# Patient Record
Sex: MALE | Race: WHITE | NOT HISPANIC OR LATINO | Employment: FULL TIME | URBAN - METROPOLITAN AREA
[De-identification: names, ages, dates, MRNs, and addresses within clinical notes are randomized per-mention and may not be internally consistent; named-entity substitution may affect disease eponyms.]

---

## 2017-07-13 ENCOUNTER — OFFICE VISIT (OUTPATIENT)
Dept: FAMILY MEDICINE CLINIC | Facility: CLINIC | Age: 72
End: 2017-07-13

## 2017-07-13 VITALS
OXYGEN SATURATION: 96 % | DIASTOLIC BLOOD PRESSURE: 82 MMHG | HEART RATE: 67 BPM | HEIGHT: 72 IN | SYSTOLIC BLOOD PRESSURE: 128 MMHG | WEIGHT: 246 LBS | BODY MASS INDEX: 33.32 KG/M2

## 2017-07-13 DIAGNOSIS — G47.33 OSA ON CPAP: Primary | ICD-10-CM

## 2017-07-13 DIAGNOSIS — Z99.89 OSA ON CPAP: Primary | ICD-10-CM

## 2017-07-13 DIAGNOSIS — Z12.11 SCREEN FOR COLON CANCER: ICD-10-CM

## 2017-07-13 PROCEDURE — 99213 OFFICE O/P EST LOW 20 MIN: CPT | Performed by: INTERNAL MEDICINE

## 2017-07-13 RX ORDER — SILDENAFIL 100 MG/1
100 TABLET, FILM COATED ORAL DAILY PRN
COMMUNITY
End: 2018-11-01 | Stop reason: SDUPTHER

## 2017-07-13 NOTE — PROGRESS NOTES
Chief Complaint   Patient presents with   • Follow-up      Subjective   Shaka Gurrola is a 71 y.o. male    History of Present Illness the patient was having tight feeling in his head.  He eliminated daily Cialis as a potential source for that and this made things better.  We have been following his blood pressure closely.  His blood pressure is good.  The following portions of the patient's history were reviewed and updated as appropriate: allergies, current medications, past social history and problem list    No Known Allergies   Past Medical History:   Diagnosis Date   • Atrophic testicle     Left   • BPH with elevated PSA    • CAD (coronary artery disease)    • ED (erectile dysfunction)    • Hiatal hernia with gastroesophageal reflux    • Hx of iron deficiency anemia    • Inguinal hernia     Right   • Osteoarthritis    • Tubular adenoma of colon       Past Surgical History:   Procedure Laterality Date   • ANKLE SURGERY Left     Plate   • CORONARY ANGIOPLASTY WITH STENT PLACEMENT     • KNEE ARTHROSCOPY Right    • PROSTATE BIOPSY     • TOTAL HIP ARTHROPLASTY REVISION Left       Social History     Social History   • Marital status:      Spouse name: N/A   • Number of children: N/A   • Years of education: N/A     Occupational History   • Not on file.     Social History Main Topics   • Smoking status: Never Smoker   • Smokeless tobacco: Never Used   • Alcohol use Yes   • Drug use: Not on file   • Sexual activity: Not on file     Other Topics Concern   • Not on file     Social History Narrative      Current Outpatient Prescriptions on File Prior to Visit   Medication Sig Dispense Refill   • clopidogrel (PLAVIX) 75 MG tablet Take 75 mg by mouth Daily.     • esomeprazole (nexIUM) 40 MG capsule Take 40 mg by mouth Every Morning Before Breakfast.     • rosuvastatin (CRESTOR) 20 MG tablet Take 20 mg by mouth Daily.     • [DISCONTINUED] tadalafil (CIALIS) 20 MG tablet Take 20 mg by mouth Daily As Needed for erectile  dysfunction.       No current facility-administered medications on file prior to visit.         Review of Systems   Constitutional: Negative for appetite change and fatigue.   HENT: Negative for ear pain and sore throat.    Eyes: Negative for itching and visual disturbance.   Respiratory: Negative for cough and shortness of breath.    Cardiovascular: Negative for chest pain and palpitations.   Gastrointestinal: Negative for abdominal pain and nausea.   Endocrine: Negative for cold intolerance and heat intolerance.   Genitourinary: Negative for dysuria and hematuria.   Musculoskeletal: Negative for arthralgias and back pain.   Skin: Negative for rash and wound.   Allergic/Immunologic: Negative for environmental allergies and food allergies.   Neurological: Negative for dizziness and headaches.   Hematological: Negative for adenopathy. Does not bruise/bleed easily.   Psychiatric/Behavioral: Negative for sleep disturbance. The patient is not nervous/anxious.        Objective     Vitals:    07/13/17 0846   BP: 128/82   Pulse: 67   SpO2: 96%       Physical Exam   Constitutional: He is oriented to person, place, and time.   Genitourinary: Prostate normal.   Neurological: He is alert and oriented to person, place, and time. He has normal reflexes. He displays normal reflexes. No cranial nerve deficit. He exhibits normal muscle tone. Coordination normal.   Vitals reviewed.      Assessment/Plan   Problem List Items Addressed This Visit     None      Visit Diagnoses     FADY on CPAP    -  Primary    Relevant Orders    Ambulatory Referral to Sleep Medicine    Screen for colon cancer        Relevant Orders    Ambulatory Referral For Screening Colonoscopy

## 2017-08-29 ENCOUNTER — TELEPHONE (OUTPATIENT)
Dept: FAMILY MEDICINE CLINIC | Facility: CLINIC | Age: 72
End: 2017-08-29

## 2017-08-29 DIAGNOSIS — G47.33 OBSTRUCTIVE SLEEP APNEA SYNDROME: Primary | ICD-10-CM

## 2017-08-29 NOTE — TELEPHONE ENCOUNTER
NEEDS FULL FACIAL MASK/HEAD GEAR FOR C-PAP,  HAS SLEEP APNEA. SEND RX TO [facility] PH: [phone] PLEASE CALL PATIENT WHEN RX SENT IN. [phone].

## 2017-12-04 ENCOUNTER — OUTSIDE FACILITY SERVICE (OUTPATIENT)
Dept: GASTROENTEROLOGY | Facility: CLINIC | Age: 72
End: 2017-12-04

## 2017-12-04 PROCEDURE — 45378 DIAGNOSTIC COLONOSCOPY: CPT | Performed by: INTERNAL MEDICINE

## 2018-01-03 RX ORDER — ROSUVASTATIN CALCIUM 20 MG/1
20 TABLET, COATED ORAL DAILY
Qty: 30 TABLET | Refills: 0 | Status: SHIPPED | OUTPATIENT
Start: 2018-01-03 | End: 2018-02-02 | Stop reason: SDUPTHER

## 2018-01-03 NOTE — TELEPHONE ENCOUNTER
E-Rx Rosuvastatin 20 mg tablets, take 1 tablet po qd #30, 0 refills to [facility] on [facility].

## 2018-02-02 ENCOUNTER — TELEPHONE (OUTPATIENT)
Dept: FAMILY MEDICINE CLINIC | Facility: CLINIC | Age: 73
End: 2018-02-02

## 2018-02-02 RX ORDER — ROSUVASTATIN CALCIUM 20 MG/1
20 TABLET, COATED ORAL DAILY
Qty: 30 TABLET | Refills: 0 | Status: SHIPPED | OUTPATIENT
Start: 2018-02-02 | End: 2022-09-26

## 2018-02-02 NOTE — TELEPHONE ENCOUNTER
E-Rx Crestor 20 mg tablets, take 1 tablet po qd #30, 0 refills to [facility] on [facility].

## 2018-07-03 ENCOUNTER — OFFICE VISIT (OUTPATIENT)
Dept: FAMILY MEDICINE CLINIC | Facility: CLINIC | Age: 73
End: 2018-07-03

## 2018-07-03 VITALS
WEIGHT: 205 LBS | BODY MASS INDEX: 27.77 KG/M2 | SYSTOLIC BLOOD PRESSURE: 148 MMHG | HEART RATE: 67 BPM | HEIGHT: 72 IN | OXYGEN SATURATION: 97 % | DIASTOLIC BLOOD PRESSURE: 86 MMHG

## 2018-07-03 DIAGNOSIS — R42 DIZZINESS: Primary | ICD-10-CM

## 2018-07-03 DIAGNOSIS — R03.0 ELEVATED BLOOD PRESSURE READING: ICD-10-CM

## 2018-07-03 PROCEDURE — 99214 OFFICE O/P EST MOD 30 MIN: CPT | Performed by: PHYSICIAN ASSISTANT

## 2018-07-03 RX ORDER — LOSARTAN POTASSIUM 25 MG/1
25 TABLET ORAL DAILY
Qty: 30 TABLET | Refills: 0 | Status: SHIPPED | OUTPATIENT
Start: 2018-07-03 | End: 2018-07-30 | Stop reason: SINTOL

## 2018-07-03 RX ORDER — ASPIRIN 81 MG/1
81 TABLET ORAL DAILY
COMMUNITY

## 2018-07-03 NOTE — PROGRESS NOTES
I have reviewed the notes, assessments, and/or procedures performed by SAMUEL Headley, I concur with her/his documentation of Shaka Gurrola.

## 2018-07-03 NOTE — PROGRESS NOTES
"    Chief Complaint   Patient presents with   • Dizziness     elevated BP       HPI     Shaka Gurrola is a pleasant 72 y.o. male who presents for evaluation of \"chief complaint.\" Pt reports constant dizziness for the last several weeks. 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 Has not used sildenafil in the last week but has persistent symptoms. Feels a little off balance with walking but also has symptoms at rest. He denies new medications, falls, headache, chest pain, dyspnea, syncope, vertigo, or URI complaints. He purchased a blood pressure cuff and states readings the last 2 days have been consistently in the 140s/90s. This AM his blood pressure was 148/93. He has no prior history of hypertension per his report. Has 4 coronary stents. Had \"normal\" stress test, urine flow cytometry, echocardiogram, and labs at [facility] in March (data deficit).       Past Medical History:   Diagnosis Date   • Atrophic testicle     Left   • BPH with elevated PSA    • CAD (coronary artery disease)    • ED (erectile dysfunction)    • Hiatal hernia with gastroesophageal reflux    • Hx of iron deficiency anemia    • Inguinal hernia     Right   • Osteoarthritis    • Tubular adenoma of colon        Past Surgical History:   Procedure Laterality Date   • ANKLE SURGERY Left     Plate   • CORONARY ANGIOPLASTY WITH STENT PLACEMENT     • KNEE ARTHROSCOPY Right    • PROSTATE BIOPSY     • TOTAL HIP ARTHROPLASTY REVISION Left        Family History   Problem Relation Age of Onset   • Aneurysm Mother    • Lung cancer Father        Social History     Social History   • Marital status:      Spouse name: N/A   • Number of children: N/A   • Years of education: N/A     Occupational History   • Not on file.     Social History Main Topics   • Smoking status: Never Smoker   • Smokeless tobacco: Never Used   • Alcohol use Yes   • Drug use: No   • Sexual activity: Not on file     Other " Topics Concern   • Not on file     Social History Narrative   • No narrative on file       No Known Allergies    ROS    Review of Systems   HENT: Positive for hearing loss (chronic, no acute hearing loss) and tinnitus (chronic). Negative for ear pain and rhinorrhea.    Respiratory: Negative for cough and shortness of breath.    Cardiovascular: Negative for chest pain and palpitations.   Neurological: Positive for dizziness. Negative for syncope.       Vitals:    07/03/18 1025   BP: 147/84   Pulse: 67   SpO2: 97%         Current Outpatient Prescriptions:   •  aspirin 81 MG EC tablet, Take 81 mg by mouth Daily., Disp: , Rfl:   •  rosuvastatin (CRESTOR) 20 MG tablet, Take 1 tablet by mouth Daily., Disp: 30 tablet, Rfl: 0  •  sildenafil (VIAGRA) 100 MG tablet, Take 100 mg by mouth Daily As Needed for erectile dysfunction., Disp: , Rfl:   •  clopidogrel (PLAVIX) 75 MG tablet, Take 75 mg by mouth Daily., Disp: , Rfl:   •  esomeprazole (nexIUM) 40 MG capsule, Take 40 mg by mouth Every Morning Before Breakfast., Disp: , Rfl:   •  losartan (COZAAR) 25 MG tablet, Take 1 tablet by mouth Daily., Disp: 30 tablet, Rfl: 0    PE    Physical Exam   Constitutional: He appears well-developed and well-nourished. No distress.   HENT:   Head: Normocephalic.   Right Ear: Tympanic membrane and ear canal normal.   Left Ear: Tympanic membrane and ear canal normal.   Cardiovascular: Normal rate, regular rhythm and normal heart sounds.    No murmur heard.  Pulmonary/Chest: Effort normal and breath sounds normal. He has no wheezes. He has no rales.   Neurological: He is alert. He displays a negative Romberg sign. Gait normal.   CN II-XII grossly intact/symmetric   Psychiatric: He has a normal mood and affect. His behavior is normal.        A/P    Problem List Items Addressed This Visit     Dizziness - Primary  -Uncertain etiology.  -Present a few weeks.   -BP has been running mildly high and may contribute. Certainly not low today.     -Benefits, risks, possible side-effects of low-dose losartan were discussed. Pt agreeable to a trial.  -Advised pt to monitor blood pressures at home, keep log, and bring in home device on follow-up in 2 weeks.        Elevated blood pressure reading          Plan of care reviewed with patient at the conclusion of today's visit. Education was provided regarding diagnosis, management and any prescribed or recommended OTC medications.  Patient verbalizes understanding of and agreement with management plan.        SAMUEL Wlison

## 2018-07-30 ENCOUNTER — OFFICE VISIT (OUTPATIENT)
Dept: FAMILY MEDICINE CLINIC | Facility: CLINIC | Age: 73
End: 2018-07-30

## 2018-07-30 VITALS
WEIGHT: 244.8 LBS | SYSTOLIC BLOOD PRESSURE: 132 MMHG | HEIGHT: 72 IN | DIASTOLIC BLOOD PRESSURE: 84 MMHG | BODY MASS INDEX: 33.16 KG/M2 | TEMPERATURE: 97.8 F

## 2018-07-30 DIAGNOSIS — R42 DIZZINESS: ICD-10-CM

## 2018-07-30 DIAGNOSIS — R03.0 ELEVATED BLOOD PRESSURE READING: Primary | ICD-10-CM

## 2018-07-30 DIAGNOSIS — N43.3 HYDROCELE, RIGHT: ICD-10-CM

## 2018-07-30 PROCEDURE — 99213 OFFICE O/P EST LOW 20 MIN: CPT | Performed by: PHYSICIAN ASSISTANT

## 2018-07-30 RX ORDER — PHENTERMINE AND TOPIRAMATE 7.5; 46 MG/1; MG/1
CAPSULE, EXTENDED RELEASE ORAL
Refills: 0 | COMMUNITY
Start: 2018-07-03 | End: 2018-07-30 | Stop reason: SINTOL

## 2018-07-30 RX ORDER — AMLODIPINE BESYLATE 2.5 MG/1
2.5 TABLET ORAL DAILY
Qty: 30 TABLET | Refills: 5 | Status: SHIPPED | OUTPATIENT
Start: 2018-07-30 | End: 2018-12-18

## 2018-07-30 NOTE — PROGRESS NOTES
Chief Complaint   Patient presents with   • Follow-up     Dizziness       HPI     Shaka Gurrola is a pleasant 73 y.o. male who is here for 1 month  follow-up of dizziness. Blood pressure at home in 140/80-90s. Dizziness is resolved however loose stools began after starting losartan. Of note, pt also stopped Qsymia just prior to his last visit. /63 per home cuff today but 142/82 by office device on recheck. Hx hydrocele right testicle. Pt had u/s in February at [facility] and states mildly increased in size since then. Wants to r/o inguinal hernia. Denies groin pain.      Past Medical History:   Diagnosis Date   • Atrophic testicle     Left   • BPH with elevated PSA    • CAD (coronary artery disease)    • ED (erectile dysfunction)    • Hiatal hernia with gastroesophageal reflux    • Hx of iron deficiency anemia    • Inguinal hernia     Right   • Osteoarthritis    • Tubular adenoma of colon        Past Surgical History:   Procedure Laterality Date   • ANKLE SURGERY Left     Plate   • CORONARY ANGIOPLASTY WITH STENT PLACEMENT     • KNEE ARTHROSCOPY Right    • PROSTATE BIOPSY     • TOTAL HIP ARTHROPLASTY REVISION Left        Family History   Problem Relation Age of Onset   • Aneurysm Mother    • Lung cancer Father        Social History     Social History   • Marital status:      Spouse name: N/A   • Number of children: N/A   • Years of education: N/A     Occupational History   • Not on file.     Social History Main Topics   • Smoking status: Never Smoker   • Smokeless tobacco: Never Used   • Alcohol use Yes   • Drug use: No   • Sexual activity: Not on file     Other Topics Concern   • Not on file     Social History Narrative   • No narrative on file       No Known Allergies    ROS    Review of Systems   Cardiovascular: Negative for chest pain.   Genitourinary: Positive for scrotal swelling.   Neurological: Negative for light-headedness.       Vitals:    07/30/18 1004   BP: 132/84   Temp: 97.8 °F (36.6 °C)          Current Outpatient Prescriptions:   •  aspirin 81 MG EC tablet, Take 81 mg by mouth Daily., Disp: , Rfl:   •  esomeprazole (nexIUM) 40 MG capsule, Take 40 mg by mouth Every Morning Before Breakfast., Disp: , Rfl:   •  rosuvastatin (CRESTOR) 20 MG tablet, Take 1 tablet by mouth Daily., Disp: 30 tablet, Rfl: 0  •  sildenafil (VIAGRA) 100 MG tablet, Take 100 mg by mouth Daily As Needed for erectile dysfunction., Disp: , Rfl:   •  amLODIPine (NORVASC) 2.5 MG tablet, Take 1 tablet by mouth Daily., Disp: 30 tablet, Rfl: 5    PE    Physical Exam   Constitutional: He appears well-developed and well-nourished. No distress.   HENT:   Head: Normocephalic.   Cardiovascular: Normal rate, regular rhythm and normal heart sounds.    No murmur heard.  Pulmonary/Chest: Effort normal and breath sounds normal. He has no wheezes. He has no rales.   Abdominal: Hernia confirmed negative in the right inguinal area.   Genitourinary:   Genitourinary Comments: Large right hydrocele   Neurological: He is alert.   Psychiatric: He has a normal mood and affect. His behavior is normal.       Results    No results found for this or any previous visit.    A/P    Problem List Items Addressed This Visit        Cardiovascular and Mediastinum    Elevated blood pressure reading - Primary  -Improved.        Genitourinary    Hydrocele, right  -No inguinal hernia by exam today. Monitor  -Request records from [facility]       Other    Dizziness  -Now resolved. Blood pressure vs Qsymia side-effect.   -Mild SBP elevation today on recheck. Home cuff does not appear accurate. Recommended returning and obtaining different device.   -Blood pressure improved on losartan. Change to low dose amlodipine d/t side effects.  --Advised pt monitor blood pressures and call office with persistent blood pressures >140/90            Plan of care reviewed with patient at the conclusion of today's visit. Education was provided regarding diagnosis, management and any  prescribed or recommended OTC medications.  Patient verbalizes understanding of and agreement with management plan.        SAMUEL Wilson

## 2018-11-01 ENCOUNTER — TELEPHONE (OUTPATIENT)
Dept: FAMILY MEDICINE CLINIC | Facility: CLINIC | Age: 73
End: 2018-11-01

## 2018-11-01 RX ORDER — SILDENAFIL 100 MG/1
100 TABLET, FILM COATED ORAL DAILY PRN
Qty: 30 TABLET | Refills: 1 | Status: SHIPPED | OUTPATIENT
Start: 2018-11-01 | End: 2021-09-24 | Stop reason: SDUPTHER

## 2018-11-09 ENCOUNTER — OFFICE VISIT (OUTPATIENT)
Dept: FAMILY MEDICINE CLINIC | Facility: CLINIC | Age: 73
End: 2018-11-09

## 2018-11-09 ENCOUNTER — LAB (OUTPATIENT)
Dept: LAB | Facility: HOSPITAL | Age: 73
End: 2018-11-09

## 2018-11-09 VITALS
SYSTOLIC BLOOD PRESSURE: 118 MMHG | WEIGHT: 244.2 LBS | TEMPERATURE: 97.4 F | OXYGEN SATURATION: 96 % | HEART RATE: 76 BPM | BODY MASS INDEX: 33.11 KG/M2 | DIASTOLIC BLOOD PRESSURE: 68 MMHG

## 2018-11-09 DIAGNOSIS — R19.7 WATERY DIARRHEA: Primary | ICD-10-CM

## 2018-11-09 DIAGNOSIS — R19.7 WATERY DIARRHEA: ICD-10-CM

## 2018-11-09 LAB
ALBUMIN SERPL-MCNC: 4.15 G/DL (ref 3.2–4.8)
ALBUMIN/GLOB SERPL: 1.8 G/DL (ref 1.5–2.5)
ALP SERPL-CCNC: 62 U/L (ref 25–100)
ALT SERPL W P-5'-P-CCNC: 14 U/L (ref 7–40)
ANION GAP SERPL CALCULATED.3IONS-SCNC: 7 MMOL/L (ref 3–11)
AST SERPL-CCNC: 22 U/L (ref 0–33)
BASOPHILS # BLD AUTO: 0.03 10*3/MM3 (ref 0–0.2)
BASOPHILS NFR BLD AUTO: 0.4 % (ref 0–1)
BILIRUB SERPL-MCNC: 0.7 MG/DL (ref 0.3–1.2)
BUN BLD-MCNC: 15 MG/DL (ref 9–23)
BUN/CREAT SERPL: 16.1 (ref 7–25)
C DIFF TOX GENS STL QL NAA+PROBE: NOT DETECTED
CALCIUM SPEC-SCNC: 9.1 MG/DL (ref 8.7–10.4)
CHLORIDE SERPL-SCNC: 105 MMOL/L (ref 99–109)
CO2 SERPL-SCNC: 25 MMOL/L (ref 20–31)
CREAT BLD-MCNC: 0.93 MG/DL (ref 0.6–1.3)
DEPRECATED RDW RBC AUTO: 45.2 FL (ref 37–54)
EOSINOPHIL # BLD AUTO: 0.15 10*3/MM3 (ref 0–0.3)
EOSINOPHIL NFR BLD AUTO: 2.2 % (ref 0–3)
ERYTHROCYTE [DISTWIDTH] IN BLOOD BY AUTOMATED COUNT: 13.1 % (ref 11.3–14.5)
GFR SERPL CREATININE-BSD FRML MDRD: 80 ML/MIN/1.73
GLOBULIN UR ELPH-MCNC: 2.4 GM/DL
GLUCOSE BLD-MCNC: 93 MG/DL (ref 70–100)
HCT VFR BLD AUTO: 49.8 % (ref 38.9–50.9)
HEMOCCULT STL QL: NEGATIVE
HGB BLD-MCNC: 16.4 G/DL (ref 13.1–17.5)
IMM GRANULOCYTES # BLD: 0.02 10*3/MM3 (ref 0–0.03)
IMM GRANULOCYTES NFR BLD: 0.3 % (ref 0–0.6)
LYMPHOCYTES # BLD AUTO: 1.7 10*3/MM3 (ref 0.6–4.8)
LYMPHOCYTES NFR BLD AUTO: 24.6 % (ref 24–44)
MCH RBC QN AUTO: 30.9 PG (ref 27–31)
MCHC RBC AUTO-ENTMCNC: 32.9 G/DL (ref 32–36)
MCV RBC AUTO: 94 FL (ref 80–99)
MONOCYTES # BLD AUTO: 0.85 10*3/MM3 (ref 0–1)
MONOCYTES NFR BLD AUTO: 12.3 % (ref 0–12)
NEUTROPHILS # BLD AUTO: 4.17 10*3/MM3 (ref 1.5–8.3)
NEUTROPHILS NFR BLD AUTO: 60.2 % (ref 41–71)
PLATELET # BLD AUTO: 183 10*3/MM3 (ref 150–450)
PMV BLD AUTO: 11.1 FL (ref 6–12)
POTASSIUM BLD-SCNC: 4.6 MMOL/L (ref 3.5–5.5)
PROT SERPL-MCNC: 6.5 G/DL (ref 5.7–8.2)
RBC # BLD AUTO: 5.3 10*6/MM3 (ref 4.2–5.76)
SODIUM BLD-SCNC: 137 MMOL/L (ref 132–146)
WBC NRBC COR # BLD: 6.92 10*3/MM3 (ref 3.5–10.8)
WBC STL QL MICRO: NORMAL

## 2018-11-09 PROCEDURE — 82272 OCCULT BLD FECES 1-3 TESTS: CPT | Performed by: INTERNAL MEDICINE

## 2018-11-09 PROCEDURE — 80053 COMPREHEN METABOLIC PANEL: CPT | Performed by: INTERNAL MEDICINE

## 2018-11-09 PROCEDURE — 99214 OFFICE O/P EST MOD 30 MIN: CPT | Performed by: INTERNAL MEDICINE

## 2018-11-09 PROCEDURE — 85025 COMPLETE CBC W/AUTO DIFF WBC: CPT | Performed by: INTERNAL MEDICINE

## 2018-11-09 PROCEDURE — 87209 SMEAR COMPLEX STAIN: CPT | Performed by: INTERNAL MEDICINE

## 2018-11-09 PROCEDURE — 87046 STOOL CULTR AEROBIC BACT EA: CPT | Performed by: INTERNAL MEDICINE

## 2018-11-09 PROCEDURE — 87045 FECES CULTURE AEROBIC BACT: CPT | Performed by: INTERNAL MEDICINE

## 2018-11-09 PROCEDURE — 87493 C DIFF AMPLIFIED PROBE: CPT | Performed by: INTERNAL MEDICINE

## 2018-11-09 PROCEDURE — 87205 SMEAR GRAM STAIN: CPT | Performed by: INTERNAL MEDICINE

## 2018-11-09 PROCEDURE — 87177 OVA AND PARASITES SMEARS: CPT | Performed by: INTERNAL MEDICINE

## 2018-11-09 RX ORDER — MELOXICAM 15 MG/1
15 TABLET ORAL DAILY
COMMUNITY
End: 2020-09-23

## 2018-11-09 NOTE — PROGRESS NOTES
"Chief Complaint:  Watery diarrhea    HPI:  Shaka Gurrola is a 73 y.o. male who presents today for watery diarrhea ongoing for 1 month now. He denies any fevers, chills or abdominal pain. No nausea. He has several episodes of \"exlplosive\" diarrhea, per patient on a daily basis. Usually right after he eats starting after breakfast. He reports traveling to [place] about 5 weeks ago and symptoms started 1 week after returning and was gradual onset. He has not pain in abdomen at all and feels no relief after bowel movement. He has no prior issues with bowel movements in the past. He denies any blood in stool but states that it has an orange tint to it at times. He did not do any hiking or anything outside of the city in [place] or here in the . No recent abx and no sick contacts. Last c-scope was 2 years ago and it was normal, per patient. No recent changes in medication other than switching to mobic from aleve.     ROS:  Constitutional: no fevers, night sweats or unexplained weight loss  Eyes: no vision changes  ENT: no runny nose, ear pain, sore throat  Cardio: no chest pain, palpitations  Pulm: no shortness of breath, wheezing, or cough  GI: no abdominal pain , frequent watery diarrhea  : no difficulty urinating  MSK: no difficulty ambulating, no joint pain  Neuro: no weakness, dizziness or headache  Psych: no trouble sleeping  Endo: no change in appetite      Past Medical History:   Diagnosis Date   • Atrophic testicle     Left   • BPH with elevated PSA    • CAD (coronary artery disease)    • ED (erectile dysfunction)    • Hiatal hernia with gastroesophageal reflux    • Hx of iron deficiency anemia    • Inguinal hernia     Right   • Osteoarthritis    • Tubular adenoma of colon       Family History   Problem Relation Age of Onset   • Aneurysm Mother    • Lung cancer Father       Social History     Social History   • Marital status:      Spouse name: N/A   • Number of children: N/A   • Years of education: " N/A     Occupational History   • Not on file.     Social History Main Topics   • Smoking status: Never Smoker   • Smokeless tobacco: Never Used   • Alcohol use Yes   • Drug use: No   • Sexual activity: Not on file     Other Topics Concern   • Not on file     Social History Narrative   • No narrative on file      No Known Allergies   Immunization History   Administered Date(s) Administered   • Influenza Whole 10/04/2017   • Tdap 10/04/2017   • Zostavax 10/01/2016        PE:  Vitals:    11/09/18 0845   BP: 118/68   Pulse: 76   Temp: 97.4 °F (36.3 °C)   SpO2: 96%        Gen Appearance: NAD  HEENT: Normocephalic, PERRLA, no thyromegaly, trache midline  Heart: RRR, normal S1 and S2, no murmur  Lungs: CTA b/l, no wheezing, no crackles  Abdomen: Soft, non-tender, non-distended, no guarding and BSx4  MSK: Moves all extremities well, normal gait, no peripheral edema  Pulses: Palpable and equal b/l  Lymph nodes: No palpable lymphadenopathy   Neuro: No focal deficits      Current Outpatient Prescriptions   Medication Sig Dispense Refill   • aspirin 81 MG EC tablet Take 81 mg by mouth Daily.     • esomeprazole (nexIUM) 40 MG capsule Take 40 mg by mouth Every Morning Before Breakfast.     • meloxicam (MOBIC) 15 MG tablet Take 15 mg by mouth Daily.     • rosuvastatin (CRESTOR) 20 MG tablet Take 1 tablet by mouth Daily. 30 tablet 0   • sildenafil (VIAGRA) 100 MG tablet Take 1 tablet by mouth Daily As Needed for erectile dysfunction. 30 tablet 1   • amLODIPine (NORVASC) 2.5 MG tablet Take 1 tablet by mouth Daily. 30 tablet 5     No current facility-administered medications for this visit.         Shaka was seen today for diarrhea.    Diagnoses and all orders for this visit:    Watery diarrhea  -     CBC & Differential; Future  -     Comprehensive Metabolic Panel; Future  -     Stool Culture - Stool, Per Rectum; Future  -     Ova & Parasite Examination - Stool, Per Rectum; Future  -     Fecal Leukocytes - Stool, Per Rectum;  Future  -     Clostridium Difficile Toxin, PCR - Stool, Per Rectum; Future  Ongoing for 1 month now. Will check stool for culture, ove and parasites with recent travel. R/o C. Diff even though he has no recent abx use or hospitalizations. Hemoccult to r/o blood in stool since patient states it is sometimes orange. Checking blood work for signs of infection and/or dehydration. Imodium as needed for now. 2mg 30-45 min before meals. May need referral to GI and/or c-scope if this continues and nothing on work up. F/u 2 weeks.        Return in about 2 weeks (around 11/23/2018).

## 2018-11-11 LAB — BACTERIA SPEC AEROBE CULT: NORMAL

## 2018-11-12 DIAGNOSIS — R19.7 DIARRHEA, UNSPECIFIED TYPE: Primary | ICD-10-CM

## 2018-11-22 LAB
O+P SPEC MICRO: NORMAL
O+P STL TRI STN: NORMAL

## 2018-12-18 ENCOUNTER — LAB (OUTPATIENT)
Dept: LAB | Facility: HOSPITAL | Age: 73
End: 2018-12-18

## 2018-12-18 ENCOUNTER — OFFICE VISIT (OUTPATIENT)
Dept: GASTROENTEROLOGY | Facility: CLINIC | Age: 73
End: 2018-12-18

## 2018-12-18 VITALS
OXYGEN SATURATION: 98 % | HEART RATE: 55 BPM | SYSTOLIC BLOOD PRESSURE: 120 MMHG | WEIGHT: 244.8 LBS | TEMPERATURE: 97 F | HEIGHT: 72 IN | BODY MASS INDEX: 33.16 KG/M2 | DIASTOLIC BLOOD PRESSURE: 78 MMHG

## 2018-12-18 DIAGNOSIS — K58.0 IRRITABLE BOWEL SYNDROME WITH DIARRHEA: ICD-10-CM

## 2018-12-18 DIAGNOSIS — K58.0 IRRITABLE BOWEL SYNDROME WITH DIARRHEA: Primary | ICD-10-CM

## 2018-12-18 PROCEDURE — 82784 ASSAY IGA/IGD/IGG/IGM EACH: CPT

## 2018-12-18 PROCEDURE — 99213 OFFICE O/P EST LOW 20 MIN: CPT | Performed by: INTERNAL MEDICINE

## 2018-12-18 PROCEDURE — 36415 COLL VENOUS BLD VENIPUNCTURE: CPT

## 2018-12-18 PROCEDURE — 83516 IMMUNOASSAY NONANTIBODY: CPT

## 2018-12-18 NOTE — PROGRESS NOTES
PCP: Carlos Enrique Jamison PA    Chief Complaint   Patient presents with   • Diarrhea       History of Present Illness:   HPI  Mr. Gurrola  returns for an office visit. He is experiencing some issues with loose watery stool for a couple of months.  The patient did travel to [place] and noticed some symptoms after that trip.  However, the patient states there may been some issues prior to the trip. Mr. Gurrola states he will have cereal in the morning and then have explosive watery stool.  This also occurs after lunch.  The patient does not experience any problems with supper.  There is no history of nocturnal diarrhea.  Mr. Gurrola denies any blood in the stool.  There is no history of any new medications other than a change of Aleve to Mobic about 60 days ago.  He states that the issues with loose stool preceded the change in medication.  There is no history of night sweats, fever or chills.  He denies any known ill contacts.  The patient does experience some bloating at times but denies any saad abdominal pain after meals.  He states that his appetite overall is good.  There is no history of joint pain or unexplained skin rash.  Past Medical History:   Diagnosis Date   • Atrophic testicle     Left   • BPH with elevated PSA    • CAD (coronary artery disease)    • ED (erectile dysfunction)    • Hiatal hernia with gastroesophageal reflux    • Hx of iron deficiency anemia    • Inguinal hernia     Right   • Osteoarthritis    • Tubular adenoma of colon        Past Surgical History:   Procedure Laterality Date   • ANKLE SURGERY Left     Plate   • CORONARY ANGIOPLASTY WITH STENT PLACEMENT     • KNEE ARTHROSCOPY Right    • PROSTATE BIOPSY     • TOTAL HIP ARTHROPLASTY REVISION Left          Current Outpatient Medications:   •  aspirin 81 MG EC tablet, Take 81 mg by mouth Daily., Disp: , Rfl:   •  esomeprazole (nexIUM) 40 MG capsule, Take 40 mg by mouth Every Morning Before Breakfast., Disp: , Rfl:   •  meloxicam (MOBIC) 15 MG tablet, Take  15 mg by mouth Daily., Disp: , Rfl:   •  rosuvastatin (CRESTOR) 20 MG tablet, Take 1 tablet by mouth Daily., Disp: 30 tablet, Rfl: 0  •  sildenafil (VIAGRA) 100 MG tablet, Take 1 tablet by mouth Daily As Needed for erectile dysfunction., Disp: 30 tablet, Rfl: 1    No Known Allergies    Family History   Problem Relation Age of Onset   • Aneurysm Mother    • Lung cancer Father        Social History     Socioeconomic History   • Marital status:      Spouse name: Not on file   • Number of children: Not on file   • Years of education: Not on file   • Highest education level: Not on file   Social Needs   • Financial resource strain: Not on file   • Food insecurity - worry: Not on file   • Food insecurity - inability: Not on file   • Transportation needs - medical: Not on file   • Transportation needs - non-medical: Not on file   Occupational History   • Not on file   Tobacco Use   • Smoking status: Never Smoker   • Smokeless tobacco: Never Used   Substance and Sexual Activity   • Alcohol use: Yes   • Drug use: No   • Sexual activity: Not on file   Other Topics Concern   • Not on file   Social History Narrative   • Not on file       Review of Systems   Constitutional: Positive for fatigue. Negative for activity change, appetite change, fever and unexpected weight change.   HENT: Negative for dental problem, hearing loss, mouth sores, postnasal drip, sneezing, trouble swallowing and voice change.    Eyes: Negative for pain, redness, itching and visual disturbance.   Respiratory: Negative for cough, choking, chest tightness, shortness of breath and wheezing.    Cardiovascular: Negative for chest pain, palpitations and leg swelling.   Gastrointestinal: Positive for abdominal distention (bloating) and diarrhea. Negative for abdominal pain, anal bleeding, blood in stool, constipation, nausea, rectal pain and vomiting.   Endocrine: Negative for cold intolerance, heat intolerance, polydipsia, polyphagia and polyuria.    Genitourinary: Negative.  Negative for dysuria, enuresis, flank pain, hematuria and urgency.   Musculoskeletal: Negative for arthralgias, back pain, gait problem, joint swelling and myalgias.   Skin: Negative for color change, pallor and rash.   Allergic/Immunologic: Negative for environmental allergies, food allergies and immunocompromised state.   Neurological: Negative for dizziness, tremors, seizures, facial asymmetry, speech difficulty, numbness and headaches.   Hematological: Negative for adenopathy.   Psychiatric/Behavioral: Negative for behavioral problems, confusion, dysphoric mood, hallucinations and self-injury.       Vitals:    12/18/18 1540   BP: 120/78   Pulse: 55   Temp: 97 °F (36.1 °C)   SpO2: 98%       Physical Exam   Constitutional: He is oriented to person, place, and time. He appears well-nourished. No distress.   HENT:   Head: Atraumatic.   Mouth/Throat: Oropharynx is clear and moist. No oropharyngeal exudate.   Eyes: EOM are normal. No scleral icterus.   Neck: Neck supple. No thyromegaly present.   Cardiovascular: Normal rate, regular rhythm and normal heart sounds. Exam reveals no gallop.   No murmur heard.  Pulmonary/Chest: Effort normal and breath sounds normal. He has no wheezes. He has no rales.   Abdominal: Soft. Bowel sounds are normal. There is no tenderness. There is no rebound.   Musculoskeletal: Normal range of motion. He exhibits no edema or tenderness.   Lymphadenopathy:     He has no cervical adenopathy.   Neurological: He is alert and oriented to person, place, and time. He exhibits normal muscle tone.   Skin: Skin is dry. No erythema.   Psychiatric: He has a normal mood and affect. His behavior is normal. Thought content normal.   Vitals reviewed.      Shaka was seen today for diarrhea.    Diagnoses and all orders for this visit:    Irritable bowel syndrome with diarrhea  -     Celiac Ab tTG DGP TIgA; Future    Other orders  -     rifaximin (XIFAXAN) 550 MG tablet; Take 1  tablet by mouth Every 8 (Eight) Hours.    The patient has experienced some issues with diarrhea for over a month and the patient believes this has been present possibly since late summer.  There are no concerning signs of unexplained weight loss or anemia.  The patient had a unremarkable colonoscopy in December 2017 without polyps.  The clinical history suggest a degree of IBS that could be post viral.      Plan: Will prescribe 14 day trial of Xifaxan.           Will check celiac panel.      I spent over 50% of the office visit counseling and answering questions from the patient.

## 2018-12-19 LAB
GLIADIN PEPTIDE IGA SER-ACNC: 2 UNITS (ref 0–19)
GLIADIN PEPTIDE IGG SER-ACNC: 3 UNITS (ref 0–19)
IGA SERPL-MCNC: 51 MG/DL (ref 61–437)
TTG IGA SER-ACNC: <2 U/ML (ref 0–3)
TTG IGG SER-ACNC: <2 U/ML (ref 0–5)

## 2018-12-20 ENCOUNTER — TELEPHONE (OUTPATIENT)
Dept: GASTROENTEROLOGY | Facility: CLINIC | Age: 73
End: 2018-12-20

## 2019-12-11 ENCOUNTER — TELEPHONE (OUTPATIENT)
Dept: FAMILY MEDICINE CLINIC | Facility: CLINIC | Age: 74
End: 2019-12-11

## 2019-12-11 ENCOUNTER — OFFICE VISIT (OUTPATIENT)
Dept: FAMILY MEDICINE CLINIC | Facility: CLINIC | Age: 74
End: 2019-12-11

## 2019-12-11 VITALS
HEART RATE: 59 BPM | TEMPERATURE: 97.5 F | OXYGEN SATURATION: 98 % | BODY MASS INDEX: 33.86 KG/M2 | SYSTOLIC BLOOD PRESSURE: 140 MMHG | HEIGHT: 72 IN | DIASTOLIC BLOOD PRESSURE: 82 MMHG | WEIGHT: 250 LBS

## 2019-12-11 DIAGNOSIS — J06.9 ACUTE URI: Primary | ICD-10-CM

## 2019-12-11 PROBLEM — Z86.0100 HISTORY OF COLON POLYPS: Status: ACTIVE | Noted: 2019-12-11

## 2019-12-11 PROBLEM — Z86.010 HISTORY OF COLON POLYPS: Status: ACTIVE | Noted: 2019-12-11

## 2019-12-11 PROBLEM — I25.10 CORONARY ARTERY DISEASE: Status: ACTIVE | Noted: 2019-12-11

## 2019-12-11 PROBLEM — N52.9 ERECTILE DYSFUNCTION: Status: ACTIVE | Noted: 2019-12-11

## 2019-12-11 PROBLEM — R97.20 ELEVATED PSA: Status: ACTIVE | Noted: 2019-12-11

## 2019-12-11 PROBLEM — K58.0 IRRITABLE BOWEL SYNDROME WITH DIARRHEA: Status: ACTIVE | Noted: 2019-12-11

## 2019-12-11 PROCEDURE — 99213 OFFICE O/P EST LOW 20 MIN: CPT | Performed by: PHYSICIAN ASSISTANT

## 2019-12-11 RX ORDER — ASPIRIN 81 MG/1
TABLET, CHEWABLE ORAL
COMMUNITY
End: 2020-09-23

## 2019-12-11 RX ORDER — AZITHROMYCIN 250 MG
CAPSULE ORAL
Qty: 6 TABLET | Refills: 0 | Status: SHIPPED | OUTPATIENT
Start: 2019-12-11 | End: 2020-08-03

## 2019-12-11 RX ORDER — FLUTICASONE PROPIONATE 50 MCG
1 SPRAY, SUSPENSION (ML) NASAL DAILY
Qty: 1 BOTTLE | Refills: 0 | Status: SHIPPED | OUTPATIENT
Start: 2019-12-11 | End: 2022-09-26

## 2019-12-11 RX ORDER — SILDENAFIL CITRATE 20 MG/1
TABLET ORAL EVERY 8 HOURS SCHEDULED
COMMUNITY
End: 2021-09-24

## 2019-12-11 RX ORDER — DEXTROMETHORPHAN HYDROBROMIDE AND PROMETHAZINE HYDROCHLORIDE 15; 6.25 MG/5ML; MG/5ML
5 SYRUP ORAL 4 TIMES DAILY PRN
Qty: 118 ML | Refills: 0 | Status: SHIPPED | OUTPATIENT
Start: 2019-12-11 | End: 2020-08-03

## 2019-12-11 NOTE — TELEPHONE ENCOUNTER
[facility] called. They need clarification on the instructions for the Zpac.   The instructions say: Take 2 tablets the first day, then 1 tablet daily for 4 days., Normal.but then in another section it says dispense as written. Please call [facility] to clarify.

## 2019-12-11 NOTE — TELEPHONE ENCOUNTER
Brand name is on back order so they will prescribe the generic and give the patient the pills only and not the pack.

## 2019-12-11 NOTE — PATIENT INSTRUCTIONS
"· Attain adequate rest and increase clear fluid intake.   · Use warm salt water gargles, lozenges, hot teat with honey as needed for throat comfort and/or Delsym syrup as needed for cough.   · Use Mucinex 600 mg twice daily and nasal saline irrigation with \"ocean\" or \"simply saline\" brand sterile nasal spray twice daily   · Use warm compresses over sinuses for comfort   · Use Tylenol 500 mg every 4 hours as needed for headache, body aches, and/or fever reduction  · Use Flonase 1 spray each nostril daily    Call or Return to office if symptoms worsen or persist.   "

## 2019-12-11 NOTE — PROGRESS NOTES
"    Chief Complaint   Patient presents with   • Cough     persistent cough and cold x4 days used OTC meds       HPI     Shaka Gurrola is a pleasant 74 y.o. male who presents for evaluation of \"chief complaint.\" Here with symptoms of productive cough, white sputum, and shortness of breath x 4 days. He admits to sore throat, losing his voice, postnasal drip, and rhinorrhea. No wheezing, fever, or chills. He was in [place] and [place] for a cruise a couple weeks ago. Returned from [place] last night. Not sleep well due to cough. No known ill contacts.      Past Medical History:   Diagnosis Date   • Atrophic testicle     Left   • BPH with elevated PSA    • CAD (coronary artery disease)    • ED (erectile dysfunction)    • Hiatal hernia with gastroesophageal reflux    • Hx of iron deficiency anemia    • Inguinal hernia     Right   • Osteoarthritis    • Tubular adenoma of colon        Past Surgical History:   Procedure Laterality Date   • ANKLE SURGERY Left     Plate   • CORONARY ANGIOPLASTY WITH STENT PLACEMENT     • KNEE ARTHROSCOPY Right    • PROSTATE BIOPSY     • TOTAL HIP ARTHROPLASTY REVISION Left        Family History   Problem Relation Age of Onset   • Aneurysm Mother    • Lung cancer Father        Social History     Socioeconomic History   • Marital status:      Spouse name: Not on file   • Number of children: Not on file   • Years of education: Not on file   • Highest education level: Not on file   Tobacco Use   • Smoking status: Never Smoker   • Smokeless tobacco: Never Used   Substance and Sexual Activity   • Alcohol use: Yes   • Drug use: No       No Known Allergies    ROS    Review of Systems   Constitutional: Positive for fatigue. Negative for chills and fever.   HENT: Positive for rhinorrhea and sore throat. Negative for ear pain and postnasal drip.    Respiratory: Positive for cough and shortness of breath. Negative for wheezing.    Cardiovascular: Negative for chest pain.   Gastrointestinal: " Positive for diarrhea (chronic). Negative for vomiting.   Musculoskeletal: Negative for myalgias.   Neurological: Negative for headache.       Vitals:    12/11/19 0936   BP: 140/82   Pulse: 59   Temp: 97.5 °F (36.4 °C)   SpO2: 98%         Current Outpatient Medications:   •  aspirin 81 MG EC tablet, Take 81 mg by mouth Daily., Disp: , Rfl:   •  esomeprazole (nexIUM) 40 MG capsule, Take 40 mg by mouth Every Morning Before Breakfast., Disp: , Rfl:   •  rosuvastatin (CRESTOR) 20 MG tablet, Take 1 tablet by mouth Daily., Disp: 30 tablet, Rfl: 0  •  sildenafil (VIAGRA) 100 MG tablet, Take 1 tablet by mouth Daily As Needed for erectile dysfunction., Disp: 30 tablet, Rfl: 1  •  aspirin (ASPIRIN 81) 81 MG chewable tablet, aspirin  81mg daily, Disp: , Rfl:   •  fluticasone (FLONASE) 50 MCG/ACT nasal spray, 1 spray into the nostril(s) as directed by provider Daily., Disp: 1 bottle, Rfl: 0  •  meloxicam (MOBIC) 15 MG tablet, Take 15 mg by mouth Daily., Disp: , Rfl:   •  promethazine-dextromethorphan (PROMETHAZINE-DM) 6.25-15 MG/5ML syrup, Take 5 mL by mouth 4 (Four) Times a Day As Needed for Cough., Disp: 118 mL, Rfl: 0  •  rifaximin (XIFAXAN) 550 MG tablet, Take 1 tablet by mouth Every 8 (Eight) Hours., Disp: 42 tablet, Rfl: 0  •  sildenafil (REVATIO) 20 MG tablet, Every 8 (Eight) Hours., Disp: , Rfl:   •  ZITHROMAX Z-MARYSOL 250 MG tablet, Take 2 tablets the first day, then 1 tablet daily for 4 days., Disp: 6 tablet, Rfl: 0    PE    Physical Exam   Constitutional: He appears well-developed and well-nourished. No distress.   HENT:   Head: Normocephalic.   Nose: Mucosal edema and congestion present. Right sinus exhibits maxillary sinus tenderness. Right sinus exhibits no frontal sinus tenderness. Left sinus exhibits maxillary sinus tenderness. Left sinus exhibits no frontal sinus tenderness.   Mouth/Throat: Uvula is midline and mucous membranes are normal. Posterior oropharyngeal erythema present. No tonsillar exudate.   Superior  TMs appear mildly erythematous   Eyes: Conjunctivae are normal. Right eye exhibits no discharge. Left eye exhibits no discharge.   Neck: Normal range of motion. Neck supple.   Cardiovascular: Normal rate, regular rhythm and normal heart sounds.   Pulmonary/Chest: Effort normal and breath sounds normal. No respiratory distress. He has no wheezes. He has no rhonchi. He has no rales.   Lymphadenopathy:     He has no cervical adenopathy.        Right: No supraclavicular adenopathy present.        Left: No supraclavicular adenopathy present.   Skin: Skin is warm and dry.   Psychiatric: He has a normal mood and affect. His behavior is normal.   Vitals reviewed.       A/P    Problem List Items Addressed This Visit     None      Visit Diagnoses     Acute URI    -  Primary  -Discussed possible viral etiology and recommended initial symptomatic treatment for up to 1 week. The patient was given written instructions recommending over-the-counter products and home remedies  -Rx azithromycin to begin if not better, Rx cough syrup, flonase  -Patient also follows with PCP at the [facility] and has a physical scheduled in February    Relevant Medications    ZITHROMAX Z-MARYSOL 250 MG tablet          Plan of care reviewed with patient at the conclusion of today's visit. Education was provided regarding diagnosis, management and any prescribed or recommended OTC medications.  Patient verbalizes understanding of and agreement with management plan.        SAMUEL Wilson

## 2020-03-13 ENCOUNTER — TELEPHONE (OUTPATIENT)
Dept: FAMILY MEDICINE CLINIC | Facility: CLINIC | Age: 75
End: 2020-03-13

## 2020-03-13 RX ORDER — OSELTAMIVIR PHOSPHATE 75 MG/1
75 CAPSULE ORAL 2 TIMES DAILY
Qty: 10 CAPSULE | Refills: 0 | Status: SHIPPED | OUTPATIENT
Start: 2020-03-13 | End: 2020-03-18

## 2020-03-13 NOTE — TELEPHONE ENCOUNTER
Pt says his daughter has been confirmed to have the flu.  Pt says he is having flu like symptoms.  Pt says he has fever, cough and nasal discharge.  [facility] Tam[facility]

## 2020-03-13 NOTE — TELEPHONE ENCOUNTER
Discussed patient's symptoms. His grandaughter who is staying with him tested positive for influenza B. He has slight temperature elevations not higher than 100, cough, and runny nose x 2d ays. He denies soa or wheezing. Rx for Tamiflu discussed and sent to his pharmacy. Advised patient to call the office with any new or worsening symptoms.

## 2020-06-30 ENCOUNTER — OFFICE VISIT (OUTPATIENT)
Dept: FAMILY MEDICINE CLINIC | Facility: CLINIC | Age: 75
End: 2020-06-30

## 2020-06-30 VITALS
DIASTOLIC BLOOD PRESSURE: 82 MMHG | HEIGHT: 72 IN | SYSTOLIC BLOOD PRESSURE: 130 MMHG | HEART RATE: 62 BPM | BODY MASS INDEX: 34.4 KG/M2 | WEIGHT: 254 LBS | OXYGEN SATURATION: 96 %

## 2020-06-30 DIAGNOSIS — I10 ESSENTIAL HYPERTENSION: Primary | ICD-10-CM

## 2020-06-30 PROCEDURE — 99213 OFFICE O/P EST LOW 20 MIN: CPT | Performed by: PHYSICIAN ASSISTANT

## 2020-06-30 RX ORDER — HYDROCHLOROTHIAZIDE 12.5 MG/1
1 TABLET ORAL DAILY
COMMUNITY
Start: 2020-02-01 | End: 2020-06-30

## 2020-06-30 RX ORDER — AMLODIPINE BESYLATE 5 MG/1
TABLET ORAL DAILY
COMMUNITY
Start: 2020-05-08 | End: 2020-08-03 | Stop reason: SDUPTHER

## 2020-06-30 NOTE — PROGRESS NOTES
"    Chief Complaint   Patient presents with   • Follow-up     blood pressure believes its running high   • Labs Only     antibodies test       HPI     Shaka Gurrola is a pleasant 74 y.o. male who presents for evaluation of \"chief complaint.\" He reports his home blood pressures have been in the 140s/90s. He was started on amlodipine 5 mg qd at [facility] in February and stopped hydrochlorothiazide at that time. He has not noticed an appreciable change in blood pressures since then. He denies headaches, dizziness, chest pain, soa, or other complaints.     Past Medical History:   Diagnosis Date   • Atrophic testicle     Left   • BPH with elevated PSA    • CAD (coronary artery disease)    • ED (erectile dysfunction)    • Hiatal hernia with gastroesophageal reflux    • Hx of iron deficiency anemia    • Inguinal hernia     Right   • Osteoarthritis    • Tubular adenoma of colon        Past Surgical History:   Procedure Laterality Date   • ANKLE SURGERY Left     Plate   • CORONARY ANGIOPLASTY WITH STENT PLACEMENT     • KNEE ARTHROSCOPY Right    • PROSTATE BIOPSY     • TOTAL HIP ARTHROPLASTY Right 01/14/2019    Dr. Fernandez   • TOTAL HIP ARTHROPLASTY REVISION Left        Family History   Problem Relation Age of Onset   • Aneurysm Mother    • Lung cancer Father        Social History     Socioeconomic History   • Marital status:      Spouse name: Not on file   • Number of children: Not on file   • Years of education: Not on file   • Highest education level: Not on file   Tobacco Use   • Smoking status: Never Smoker   • Smokeless tobacco: Never Used   Substance and Sexual Activity   • Alcohol use: Yes   • Drug use: No       No Known Allergies    ROS    Review of Systems   Respiratory: Negative for shortness of breath.    Cardiovascular: Negative for chest pain.   Neurological: Negative for dizziness and headache.       Vitals:    06/30/20 1507   BP: 130/82   Pulse: 62   SpO2: 96%     Body mass index is 34.44 " kg/m².      Current Outpatient Medications:   •  amLODIPine (NORVASC) 5 MG tablet, Take  by mouth Daily., Disp: , Rfl:   •  aspirin (ASPIRIN 81) 81 MG chewable tablet, aspirin  81mg daily, Disp: , Rfl:   •  esomeprazole (nexIUM) 40 MG capsule, Take 40 mg by mouth Every Morning Before Breakfast., Disp: , Rfl:   •  fluticasone (FLONASE) 50 MCG/ACT nasal spray, 1 spray into the nostril(s) as directed by provider Daily., Disp: 1 bottle, Rfl: 0  •  meloxicam (MOBIC) 15 MG tablet, Take 15 mg by mouth Daily., Disp: , Rfl:   •  rifaximin (XIFAXAN) 550 MG tablet, Take 1 tablet by mouth Every 8 (Eight) Hours., Disp: 42 tablet, Rfl: 0  •  rosuvastatin (CRESTOR) 20 MG tablet, Take 1 tablet by mouth Daily., Disp: 30 tablet, Rfl: 0  •  sildenafil (REVATIO) 20 MG tablet, Every 8 (Eight) Hours., Disp: , Rfl:   •  sildenafil (VIAGRA) 100 MG tablet, Take 1 tablet by mouth Daily As Needed for erectile dysfunction., Disp: 30 tablet, Rfl: 1  •  aspirin 81 MG EC tablet, Take 81 mg by mouth Daily., Disp: , Rfl:   •  promethazine-dextromethorphan (PROMETHAZINE-DM) 6.25-15 MG/5ML syrup, Take 5 mL by mouth 4 (Four) Times a Day As Needed for Cough., Disp: 118 mL, Rfl: 0  •  ZITHROMAX Z-MARYSOL 250 MG tablet, Take 2 tablets the first day, then 1 tablet daily for 4 days., Disp: 6 tablet, Rfl: 0    PE    Physical Exam   Constitutional: He appears well-developed and well-nourished. No distress.   HENT:   Head: Normocephalic and atraumatic.   Eyes: Conjunctivae and EOM are normal.   Neck: Normal range of motion.   Cardiovascular: Normal rate, regular rhythm and normal heart sounds.   No murmur heard.  Pulmonary/Chest: Effort normal and breath sounds normal.   Neurological: He is alert. Gait normal.   Skin: Skin is warm and dry.   Psychiatric: He has a normal mood and affect. His speech is normal and behavior is normal.   Vitals reviewed.       A/P    Problem List Items Addressed This Visit     None      Visit Diagnoses     Essential hypertension    -   Primary  -BP for me 120/72 on repeat  -Continue amlodipine  -Recommended patient continue to monitor home readings and bring in his home device on follow-up  -RTC in 1 month    Relevant Medications    amLODIPine (NORVASC) 5 MG tablet          Plan of care was reviewed with patient at the conclusion of today's visit. Education was provided regarding diagnoses, management, prescribed or recommended OTC products, and the importance of compliance with follow-up appointments. The patient was counseled regarding the risks, benefits, and possible side-effects of treatment. I advised the patient to keep me informed of any acute changes in their status including new, worsening, or persistent symptoms. Patient expresses understanding and agreement with the management plan.        SAMUEL Wilson

## 2020-08-03 ENCOUNTER — OFFICE VISIT (OUTPATIENT)
Dept: FAMILY MEDICINE CLINIC | Facility: CLINIC | Age: 75
End: 2020-08-03

## 2020-08-03 VITALS
DIASTOLIC BLOOD PRESSURE: 88 MMHG | WEIGHT: 259 LBS | OXYGEN SATURATION: 98 % | HEIGHT: 72 IN | BODY MASS INDEX: 35.08 KG/M2 | HEART RATE: 57 BPM | SYSTOLIC BLOOD PRESSURE: 138 MMHG

## 2020-08-03 DIAGNOSIS — R51.9 NONINTRACTABLE EPISODIC HEADACHE, UNSPECIFIED HEADACHE TYPE: ICD-10-CM

## 2020-08-03 DIAGNOSIS — I10 ESSENTIAL HYPERTENSION: Primary | ICD-10-CM

## 2020-08-03 DIAGNOSIS — Z71.84 ENCOUNTER FOR COUNSELING FOR TRAVEL: ICD-10-CM

## 2020-08-03 PROCEDURE — 99213 OFFICE O/P EST LOW 20 MIN: CPT | Performed by: PHYSICIAN ASSISTANT

## 2020-08-03 RX ORDER — AMLODIPINE BESYLATE 10 MG/1
10 TABLET ORAL DAILY
Qty: 30 TABLET | Refills: 5 | Status: SHIPPED | OUTPATIENT
Start: 2020-08-03 | End: 2020-09-02

## 2020-08-03 NOTE — PATIENT INSTRUCTIONS
"DASH Eating Plan  DASH stands for \"Dietary Approaches to Stop Hypertension.\" The DASH eating plan is a healthy eating plan that has been shown to reduce high blood pressure (hypertension). It may also reduce your risk for type 2 diabetes, heart disease, and stroke. The DASH eating plan may also help with weight loss.  What are tips for following this plan?    General guidelines  · Avoid eating more than 2,300 mg (milligrams) of salt (sodium) a day. If you have hypertension, you may need to reduce your sodium intake to 1,500 mg a day.  · Limit alcohol intake to no more than 1 drink a day for nonpregnant women and 2 drinks a day for men. One drink equals 12 oz of beer, 5 oz of wine, or 1½ oz of hard liquor.  · Work with your health care provider to maintain a healthy body weight or to lose weight. Ask what an ideal weight is for you.  · Get at least 30 minutes of exercise that causes your heart to beat faster (aerobic exercise) most days of the week. Activities may include walking, swimming, or biking.  · Work with your health care provider or diet and nutrition specialist (dietitian) to adjust your eating plan to your individual calorie needs.  Reading food labels    · Check food labels for the amount of sodium per serving. Choose foods with less than 5 percent of the Daily Value of sodium. Generally, foods with less than 300 mg of sodium per serving fit into this eating plan.  · To find whole grains, look for the word \"whole\" as the first word in the ingredient list.  Shopping  · Buy products labeled as \"low-sodium\" or \"no salt added.\"  · Buy fresh foods. Avoid canned foods and premade or frozen meals.  Cooking  · Avoid adding salt when cooking. Use salt-free seasonings or herbs instead of table salt or sea salt. Check with your health care provider or pharmacist before using salt substitutes.  · Do not mendes foods. Cook foods using healthy methods such as baking, boiling, grilling, and broiling instead.  · Cook with " heart-healthy oils, such as olive, canola, soybean, or sunflower oil.  Meal planning  · Eat a balanced diet that includes:  ? 5 or more servings of fruits and vegetables each day. At each meal, try to fill half of your plate with fruits and vegetables.  ? Up to 6-8 servings of whole grains each day.  ? Less than 6 oz of lean meat, poultry, or fish each day. A 3-oz serving of meat is about the same size as a deck of cards. One egg equals 1 oz.  ? 2 servings of low-fat dairy each day.  ? A serving of nuts, seeds, or beans 5 times each week.  ? Heart-healthy fats. Healthy fats called Omega-3 fatty acids are found in foods such as flaxseeds and coldwater fish, like sardines, salmon, and mackerel.  · Limit how much you eat of the following:  ? Canned or prepackaged foods.  ? Food that is high in trans fat, such as fried foods.  ? Food that is high in saturated fat, such as fatty meat.  ? Sweets, desserts, sugary drinks, and other foods with added sugar.  ? Full-fat dairy products.  · Do not salt foods before eating.  · Try to eat at least 2 vegetarian meals each week.  · Eat more home-cooked food and less restaurant, buffet, and fast food.  · When eating at a restaurant, ask that your food be prepared with less salt or no salt, if possible.  What foods are recommended?  The items listed may not be a complete list. Talk with your dietitian about what dietary choices are best for you.  Grains  Whole-grain or whole-wheat bread. Whole-grain or whole-wheat pasta. Brown rice. Oatmeal. Quinoa. Bulgur. Whole-grain and low-sodium cereals. Janine bread. Low-fat, low-sodium crackers. Whole-wheat flour tortillas.  Vegetables  Fresh or frozen vegetables (raw, steamed, roasted, or grilled). Low-sodium or reduced-sodium tomato and vegetable juice. Low-sodium or reduced-sodium tomato sauce and tomato paste. Low-sodium or reduced-sodium canned vegetables.  Fruits  All fresh, dried, or frozen fruit. Canned fruit in natural juice (without  added sugar).  Meat and other protein foods  Skinless chicken or turkey. Ground chicken or turkey. Pork with fat trimmed off. Fish and seafood. Egg whites. Dried beans, peas, or lentils. Unsalted nuts, nut butters, and seeds. Unsalted canned beans. Lean cuts of beef with fat trimmed off. Low-sodium, lean deli meat.  Dairy  Low-fat (1%) or fat-free (skim) milk. Fat-free, low-fat, or reduced-fat cheeses. Nonfat, low-sodium ricotta or cottage cheese. Low-fat or nonfat yogurt. Low-fat, low-sodium cheese.  Fats and oils  Soft margarine without trans fats. Vegetable oil. Low-fat, reduced-fat, or light mayonnaise and salad dressings (reduced-sodium). Canola, safflower, olive, soybean, and sunflower oils. Avocado.  Seasoning and other foods  Herbs. Spices. Seasoning mixes without salt. Unsalted popcorn and pretzels. Fat-free sweets.  What foods are not recommended?  The items listed may not be a complete list. Talk with your dietitian about what dietary choices are best for you.  Grains  Baked goods made with fat, such as croissants, muffins, or some breads. Dry pasta or rice meal packs.  Vegetables  Creamed or fried vegetables. Vegetables in a cheese sauce. Regular canned vegetables (not low-sodium or reduced-sodium). Regular canned tomato sauce and paste (not low-sodium or reduced-sodium). Regular tomato and vegetable juice (not low-sodium or reduced-sodium). Pickles. Olives.  Fruits  Canned fruit in a light or heavy syrup. Fried fruit. Fruit in cream or butter sauce.  Meat and other protein foods  Fatty cuts of meat. Ribs. Fried meat. Nielsen. Sausage. Bologna and other processed lunch meats. Salami. Fatback. Hotdogs. Bratwurst. Salted nuts and seeds. Canned beans with added salt. Canned or smoked fish. Whole eggs or egg yolks. Chicken or turkey with skin.  Dairy  Whole or 2% milk, cream, and half-and-half. Whole or full-fat cream cheese. Whole-fat or sweetened yogurt. Full-fat cheese. Nondairy creamers. Whipped toppings.  Processed cheese and cheese spreads.  Fats and oils  Butter. Stick margarine. Lard. Shortening. Ghee. Nielsen fat. Tropical oils, such as coconut, palm kernel, or palm oil.  Seasoning and other foods  Salted popcorn and pretzels. Onion salt, garlic salt, seasoned salt, table salt, and sea salt. Worcestershire sauce. Tartar sauce. Barbecue sauce. Teriyaki sauce. Soy sauce, including reduced-sodium. Steak sauce. Canned and packaged gravies. Fish sauce. Oyster sauce. Cocktail sauce. Horseradish that you find on the shelf. Ketchup. Mustard. Meat flavorings and tenderizers. Bouillon cubes. Hot sauce and Tabasco sauce. Premade or packaged marinades. Premade or packaged taco seasonings. Relishes. Regular salad dressings.  Where to find more information:  · National Heart, Lung, and Blood [place]: www.nhlbi.nih.gov  · American Heart Association: www.heart.org  Summary  · The DASH eating plan is a healthy eating plan that has been shown to reduce high blood pressure (hypertension). It may also reduce your risk for type 2 diabetes, heart disease, and stroke.  · With the DASH eating plan, you should limit salt (sodium) intake to 2,300 mg a day. If you have hypertension, you may need to reduce your sodium intake to 1,500 mg a day.  · When on the DASH eating plan, aim to eat more fresh fruits and vegetables, whole grains, lean proteins, low-fat dairy, and heart-healthy fats.  · Work with your health care provider or diet and nutrition specialist (dietitian) to adjust your eating plan to your individual calorie needs.  This information is not intended to replace advice given to you by your health care provider. Make sure you discuss any questions you have with your health care provider.  Document Released: 12/06/2012 Document Revised: 11/30/2018 Document Reviewed: 12/11/2017  Elsevier Patient Education © 2020 [employer]

## 2020-08-03 NOTE — PROGRESS NOTES
"    Chief Complaint   Patient presents with   • Hypertension     1 month follow up       HPI     Shaka Gurrola is a pleasant 75 y.o. male who presents for evaluation of \"chief complaint.\" He c/o of intermittent frontal area headache since [place] last year. Feels like pressure across his forehead 2-3 times weekly, worse with sexual arousal. Spontaneously resolves with time. Denies other symptoms. He purchased a new BP cuff when his symptoms began. His readings have been in the 160s/80-90s but prior to this had always been in the 120s/60s. Hydrochlorothiazide was changed to amlodipine by his [facility] provider in February without significant improvement. He is concerned his BP may still be high.     Past Medical History:   Diagnosis Date   • Atrophic testicle     Left   • BPH with elevated PSA    • CAD (coronary artery disease)    • ED (erectile dysfunction)    • Hiatal hernia with gastroesophageal reflux    • Hx of iron deficiency anemia    • Inguinal hernia     Right   • Osteoarthritis    • Tubular adenoma of colon        Past Surgical History:   Procedure Laterality Date   • ANKLE SURGERY Left     Plate   • CORONARY ANGIOPLASTY WITH STENT PLACEMENT     • KNEE ARTHROSCOPY Right    • PROSTATE BIOPSY     • TOTAL HIP ARTHROPLASTY Right 01/14/2019    Dr. Fernandez   • TOTAL HIP ARTHROPLASTY REVISION Left        Family History   Problem Relation Age of Onset   • Aneurysm Mother    • Lung cancer Father        Social History     Socioeconomic History   • Marital status:      Spouse name: Not on file   • Number of children: Not on file   • Years of education: Not on file   • Highest education level: Not on file   Tobacco Use   • Smoking status: Never Smoker   • Smokeless tobacco: Never Used   Substance and Sexual Activity   • Alcohol use: Yes   • Drug use: No       No Known Allergies    ROS    Review of Systems   Respiratory: Negative for shortness of breath.    Cardiovascular: Negative for chest pain and leg " swelling.   Neurological: Positive for headache. Negative for dizziness, syncope and light-headedness.       Vitals:    08/03/20 1541   BP: 138/88   Pulse: 57   SpO2: 98%     Body mass index is 35.12 kg/m².      Current Outpatient Medications:   •  amLODIPine (NORVASC) 10 MG tablet, Take 1 tablet by mouth Daily., Disp: 30 tablet, Rfl: 5  •  aspirin (ASPIRIN 81) 81 MG chewable tablet, aspirin  81mg daily, Disp: , Rfl:   •  aspirin 81 MG EC tablet, Take 81 mg by mouth Daily., Disp: , Rfl:   •  esomeprazole (nexIUM) 40 MG capsule, Take 40 mg by mouth Every Morning Before Breakfast., Disp: , Rfl:   •  fluticasone (FLONASE) 50 MCG/ACT nasal spray, 1 spray into the nostril(s) as directed by provider Daily., Disp: 1 bottle, Rfl: 0  •  meloxicam (MOBIC) 15 MG tablet, Take 15 mg by mouth Daily., Disp: , Rfl:   •  rifaximin (XIFAXAN) 550 MG tablet, Take 1 tablet by mouth Every 8 (Eight) Hours., Disp: 42 tablet, Rfl: 0  •  rosuvastatin (CRESTOR) 20 MG tablet, Take 1 tablet by mouth Daily., Disp: 30 tablet, Rfl: 0  •  sildenafil (REVATIO) 20 MG tablet, Every 8 (Eight) Hours., Disp: , Rfl:   •  sildenafil (VIAGRA) 100 MG tablet, Take 1 tablet by mouth Daily As Needed for erectile dysfunction., Disp: 30 tablet, Rfl: 1  •  promethazine-dextromethorphan (PROMETHAZINE-DM) 6.25-15 MG/5ML syrup, Take 5 mL by mouth 4 (Four) Times a Day As Needed for Cough., Disp: 118 mL, Rfl: 0  •  ZITHROMAX Z-MARYSOL 250 MG tablet, Take 2 tablets the first day, then 1 tablet daily for 4 days., Disp: 6 tablet, Rfl: 0    PE    Physical Exam   Constitutional: He appears well-developed and well-nourished. No distress.   HENT:   Head: Normocephalic and atraumatic.   Eyes: Pupils are equal, round, and reactive to light. Conjunctivae and EOM are normal.   Neck: Normal range of motion.   Cardiovascular: Normal rate, regular rhythm and normal heart sounds.   No murmur heard.  Pulmonary/Chest: Effort normal and breath sounds normal.   Neurological: He is alert.  Gait normal.   Skin: Skin is warm and dry.   Psychiatric: He has a normal mood and affect. His speech is normal and behavior is normal.   Vitals reviewed.       A/P    Problem List Items Addressed This Visit     None      Visit Diagnoses     Essential hypertension    -  Primary  -BP mildly elevated today, 140/82 on repeat with elevated home readings  -His device does read high when compared to our office cuff.   -Increase amlodipine to 10 mg qd  -RTC in 1 month for BP check or sooner if needed     Addendum: h/a worsened with sexual activity, will order CTA head with/without contrast to r/o aneurysm    Relevant Medications    amLODIPine (NORVASC) 10 MG tablet    Nonintractable episodic headache, unspecified headache type      -If his headache persists and BP normalizes, will recommend further evaluation with brain MRI          Plan of care was reviewed with patient at the conclusion of today's visit. Education was provided regarding diagnoses, management, prescribed or recommended OTC products, and the importance of compliance with follow-up appointments. The patient was counseled regarding the risks, benefits, and possible side-effects of treatment. I advised the patient to keep me informed of any acute changes in their status including new, worsening, or persistent symptoms. Patient expresses understanding and agreement with the management plan.        SAMUEL Wilson

## 2020-08-04 ENCOUNTER — TELEPHONE (OUTPATIENT)
Dept: FAMILY MEDICINE CLINIC | Facility: CLINIC | Age: 75
End: 2020-08-04

## 2020-08-04 DIAGNOSIS — G44.82 HEADACHE ASSOCIATED WITH SEXUAL ACTIVITY: Primary | ICD-10-CM

## 2020-08-04 NOTE — TELEPHONE ENCOUNTER
Called patient to speak with him re: recent symptoms and headache. LVM requesting patient call the office back to speak with me.

## 2020-08-05 NOTE — TELEPHONE ENCOUNTER
I spoke with Mr. Gurrola. Symptoms are not significantly changed from his visit 2 days ago. Recommend CTA head with and without IV contrast to r/o aneurysm to which he agrees.

## 2020-08-10 ENCOUNTER — TELEPHONE (OUTPATIENT)
Dept: FAMILY MEDICINE CLINIC | Facility: CLINIC | Age: 75
End: 2020-08-10

## 2020-08-10 NOTE — TELEPHONE ENCOUNTER
PATIENT STATES HE IS TRAVELING TO [place] AND THE [facility] REQUIRES TRAVELERS TO HAVE AN NAAT COVID TEST PRIOT TO COMING TO [place].    PLEASE CALL AND ADVISE [phone]

## 2020-08-11 NOTE — TELEPHONE ENCOUNTER
Attempted to call patient back.   He can go to one of the [facility] urgent care centers for testing. Left detailed VM to go to [facility] or [facility].

## 2020-08-19 ENCOUNTER — HOSPITAL ENCOUNTER (OUTPATIENT)
Dept: CT IMAGING | Facility: HOSPITAL | Age: 75
Discharge: HOME | End: 2020-08-19
Admitting: PHYSICIAN ASSISTANT

## 2020-08-19 DIAGNOSIS — G44.82 HEADACHE ASSOCIATED WITH SEXUAL ACTIVITY: ICD-10-CM

## 2020-08-19 LAB — CREAT BLDA-MCNC: 0.9 MG/DL (ref 0.6–1.3)

## 2020-08-19 PROCEDURE — 70496 CT ANGIOGRAPHY HEAD: CPT

## 2020-08-19 PROCEDURE — 0 IOPAMIDOL PER 1 ML: Performed by: PHYSICIAN ASSISTANT

## 2020-08-19 PROCEDURE — 82565 ASSAY OF CREATININE: CPT

## 2020-08-19 RX ADMIN — IOPAMIDOL 75 ML: 755 INJECTION, SOLUTION INTRAVENOUS at 08:54

## 2020-09-02 ENCOUNTER — LAB (OUTPATIENT)
Dept: LAB | Facility: HOSPITAL | Age: 75
End: 2020-09-02

## 2020-09-02 ENCOUNTER — OFFICE VISIT (OUTPATIENT)
Dept: FAMILY MEDICINE CLINIC | Facility: CLINIC | Age: 75
End: 2020-09-02

## 2020-09-02 VITALS
BODY MASS INDEX: 34.95 KG/M2 | OXYGEN SATURATION: 98 % | SYSTOLIC BLOOD PRESSURE: 118 MMHG | DIASTOLIC BLOOD PRESSURE: 78 MMHG | HEIGHT: 72 IN | WEIGHT: 258 LBS | HEART RATE: 50 BPM

## 2020-09-02 DIAGNOSIS — R00.1 SINUS BRADYCARDIA: ICD-10-CM

## 2020-09-02 DIAGNOSIS — R06.09 DYSPNEA ON EXERTION: Primary | ICD-10-CM

## 2020-09-02 DIAGNOSIS — I10 ESSENTIAL HYPERTENSION: ICD-10-CM

## 2020-09-02 LAB
ALBUMIN SERPL-MCNC: 3.9 G/DL (ref 3.5–5.2)
ALBUMIN/GLOB SERPL: 1.2 G/DL
ALP SERPL-CCNC: 65 U/L (ref 39–117)
ALT SERPL W P-5'-P-CCNC: 9 U/L (ref 1–41)
ANION GAP SERPL CALCULATED.3IONS-SCNC: 7.9 MMOL/L (ref 5–15)
AST SERPL-CCNC: 17 U/L (ref 1–40)
BASOPHILS # BLD AUTO: 0.04 10*3/MM3 (ref 0–0.2)
BASOPHILS NFR BLD AUTO: 0.6 % (ref 0–1.5)
BILIRUB SERPL-MCNC: 0.5 MG/DL (ref 0–1.2)
BUN SERPL-MCNC: 16 MG/DL (ref 8–23)
BUN/CREAT SERPL: 17.4 (ref 7–25)
CALCIUM SPEC-SCNC: 9.4 MG/DL (ref 8.6–10.5)
CHLORIDE SERPL-SCNC: 107 MMOL/L (ref 98–107)
CO2 SERPL-SCNC: 24.1 MMOL/L (ref 22–29)
CREAT SERPL-MCNC: 0.92 MG/DL (ref 0.76–1.27)
DEPRECATED RDW RBC AUTO: 44.1 FL (ref 37–54)
EOSINOPHIL # BLD AUTO: 0.14 10*3/MM3 (ref 0–0.4)
EOSINOPHIL NFR BLD AUTO: 2 % (ref 0.3–6.2)
ERYTHROCYTE [DISTWIDTH] IN BLOOD BY AUTOMATED COUNT: 13.5 % (ref 12.3–15.4)
GFR SERPL CREATININE-BSD FRML MDRD: 81 ML/MIN/1.73
GLOBULIN UR ELPH-MCNC: 3.2 GM/DL
GLUCOSE SERPL-MCNC: 88 MG/DL (ref 65–99)
HCT VFR BLD AUTO: 43.9 % (ref 37.5–51)
HGB BLD-MCNC: 14.9 G/DL (ref 13–17.7)
IMM GRANULOCYTES # BLD AUTO: 0.03 10*3/MM3 (ref 0–0.05)
IMM GRANULOCYTES NFR BLD AUTO: 0.4 % (ref 0–0.5)
LYMPHOCYTES # BLD AUTO: 1.73 10*3/MM3 (ref 0.7–3.1)
LYMPHOCYTES NFR BLD AUTO: 24.4 % (ref 19.6–45.3)
MCH RBC QN AUTO: 30.3 PG (ref 26.6–33)
MCHC RBC AUTO-ENTMCNC: 33.9 G/DL (ref 31.5–35.7)
MCV RBC AUTO: 89.4 FL (ref 79–97)
MONOCYTES # BLD AUTO: 0.93 10*3/MM3 (ref 0.1–0.9)
MONOCYTES NFR BLD AUTO: 13.1 % (ref 5–12)
NEUTROPHILS NFR BLD AUTO: 4.22 10*3/MM3 (ref 1.7–7)
NEUTROPHILS NFR BLD AUTO: 59.5 % (ref 42.7–76)
NRBC BLD AUTO-RTO: 0 /100 WBC (ref 0–0.2)
NT-PROBNP SERPL-MCNC: 210.9 PG/ML (ref 0–900)
PLATELET # BLD AUTO: 196 10*3/MM3 (ref 140–450)
PMV BLD AUTO: 11.1 FL (ref 6–12)
POTASSIUM SERPL-SCNC: 4.4 MMOL/L (ref 3.5–5.2)
PROT SERPL-MCNC: 7.1 G/DL (ref 6–8.5)
RBC # BLD AUTO: 4.91 10*6/MM3 (ref 4.14–5.8)
SODIUM SERPL-SCNC: 139 MMOL/L (ref 136–145)
WBC # BLD AUTO: 7.09 10*3/MM3 (ref 3.4–10.8)

## 2020-09-02 PROCEDURE — 85025 COMPLETE CBC W/AUTO DIFF WBC: CPT | Performed by: PHYSICIAN ASSISTANT

## 2020-09-02 PROCEDURE — 36415 COLL VENOUS BLD VENIPUNCTURE: CPT | Performed by: PHYSICIAN ASSISTANT

## 2020-09-02 PROCEDURE — 84443 ASSAY THYROID STIM HORMONE: CPT | Performed by: PHYSICIAN ASSISTANT

## 2020-09-02 PROCEDURE — 93000 ELECTROCARDIOGRAM COMPLETE: CPT | Performed by: PHYSICIAN ASSISTANT

## 2020-09-02 PROCEDURE — 83880 ASSAY OF NATRIURETIC PEPTIDE: CPT | Performed by: PHYSICIAN ASSISTANT

## 2020-09-02 PROCEDURE — 80053 COMPREHEN METABOLIC PANEL: CPT | Performed by: PHYSICIAN ASSISTANT

## 2020-09-02 PROCEDURE — 99214 OFFICE O/P EST MOD 30 MIN: CPT | Performed by: PHYSICIAN ASSISTANT

## 2020-09-02 RX ORDER — LOSARTAN POTASSIUM 50 MG/1
50 TABLET ORAL DAILY
Qty: 30 TABLET | Refills: 5 | Status: SHIPPED | OUTPATIENT
Start: 2020-09-02 | End: 2021-03-04

## 2020-09-02 NOTE — PROGRESS NOTES
"Chief Complaint   Patient presents with   • Shortness of Breath   • Fatigue       HPI     Shaka Gurrola is a 75 y.o. male with CAD who is here for 1 month follow-up of hypertension.  He reports dyspnea on exertion and fatigue since his blood pressure medications were changed in February by his provider at the [facility]. He was started on amlodipine and taken off hydrochlorothiazide at that time.  He denies chest pain. He did have a normal stress test in February.  Feels like he could take a nap every day.  Has noticed increased swelling in his hands, rings do not fit.  Compliant with CPAP. His home blood pressures have been in the 140s over 80s on average since amlodipine was increased to 10 mg last month. He believes his lower blood pressure in the office today is related to Viagra use this morning.  His pulse has averaged in the 50s for his \"whole life.\"    Past Medical History:   Diagnosis Date   • Atrophic testicle     Left   • BPH with elevated PSA    • CAD (coronary artery disease)    • ED (erectile dysfunction)    • Hiatal hernia with gastroesophageal reflux    • Hx of iron deficiency anemia    • Inguinal hernia     Right   • Osteoarthritis    • Tubular adenoma of colon        Past Surgical History:   Procedure Laterality Date   • ANKLE SURGERY Left     Plate   • CORONARY ANGIOPLASTY WITH STENT PLACEMENT     • KNEE ARTHROSCOPY Right    • PROSTATE BIOPSY     • TOTAL HIP ARTHROPLASTY Right 01/14/2019    Dr. Fernandez   • TOTAL HIP ARTHROPLASTY REVISION Left        Family History   Problem Relation Age of Onset   • Aneurysm Mother    • Lung cancer Father        Social History     Socioeconomic History   • Marital status:      Spouse name: Not on file   • Number of children: Not on file   • Years of education: Not on file   • Highest education level: Not on file   Tobacco Use   • Smoking status: Never Smoker   • Smokeless tobacco: Never Used   Substance and Sexual Activity   • Alcohol use: Yes   • Drug " use: No       No Known Allergies    ROS    Review of Systems   Constitutional: Positive for fatigue. Negative for chills and fever.   Respiratory: Positive for shortness of breath. Negative for apnea, cough and wheezing.    Cardiovascular: Negative for chest pain, palpitations and leg swelling.       Vitals:    09/02/20 1046   BP: 118/78   Pulse: 50   SpO2: 98%     Body mass index is 34.99 kg/m².      Current Outpatient Medications:   •  aspirin (ASPIRIN 81) 81 MG chewable tablet, aspirin  81mg daily, Disp: , Rfl:   •  aspirin 81 MG EC tablet, Take 81 mg by mouth Daily., Disp: , Rfl:   •  esomeprazole (nexIUM) 40 MG capsule, Take 40 mg by mouth Every Morning Before Breakfast., Disp: , Rfl:   •  fluticasone (FLONASE) 50 MCG/ACT nasal spray, 1 spray into the nostril(s) as directed by provider Daily., Disp: 1 bottle, Rfl: 0  •  meloxicam (MOBIC) 15 MG tablet, Take 15 mg by mouth Daily., Disp: , Rfl:   •  rifaximin (XIFAXAN) 550 MG tablet, Take 1 tablet by mouth Every 8 (Eight) Hours., Disp: 42 tablet, Rfl: 0  •  rosuvastatin (CRESTOR) 20 MG tablet, Take 1 tablet by mouth Daily., Disp: 30 tablet, Rfl: 0  •  sildenafil (REVATIO) 20 MG tablet, Every 8 (Eight) Hours., Disp: , Rfl:   •  sildenafil (VIAGRA) 100 MG tablet, Take 1 tablet by mouth Daily As Needed for erectile dysfunction., Disp: 30 tablet, Rfl: 1  •  losartan (COZAAR) 50 MG tablet, Take 1 tablet by mouth Daily., Disp: 30 tablet, Rfl: 5    PE    Physical Exam   Constitutional: He appears well-developed and well-nourished. No distress. He is obese.  HENT:   Head: Normocephalic and atraumatic.   Mouth/Throat: Oropharynx is clear and moist.   Eyes: Conjunctivae and EOM are normal.   Neck: Normal range of motion.   Cardiovascular: Regular rhythm and normal heart sounds. Bradycardia present.   No murmur heard.  Pulmonary/Chest: Effort normal and breath sounds normal.   Musculoskeletal: He exhibits no edema.   Neurological: He is alert. Gait normal.   Skin: Skin is  warm and dry.   Psychiatric: He has a normal mood and affect. His speech is normal and behavior is normal.   Vitals reviewed.      Results    Results for orders placed or performed during the hospital encounter of 08/19/20   POC Creatinine   Result Value Ref Range    Creatinine 0.90 0.60 - 1.30 mg/dL       ECG 12 Lead  Date/Time: 9/2/2020 12:23 PM  Performed by: Carlos Enrique Jamison PA  Authorized by: Carlos Enrique Jamison PA   Previous ECG: no previous ECG available  Rhythm: sinus bradycardia  Rate: normal  BPM: 48  Conduction: conduction normal  ST Segments: ST segments normal  Other findings comments: nonspecific T wave abnormality    Clinical impression: abnormal EKG          A/P    Problem List Items Addressed This Visit     None      Visit Diagnoses     Dyspnea on exertion    -  Primary  -Reportedly since medication change  -Discontinue amlodipine and start losartan 50 mg daily  -Sinus bradycardia has been longstanding  -Discussed home BP monitoring. His home cuff has been evaluated in the office reads high  -Lab work-up to rule out anemia, thyroid dysfunction, acute heart failure  -If dyspnea persists and work-up is normal, will recommend cardiology follow-up  -Return to clinic in 2 weeks    Relevant Orders    CBC & Differential    TSH Rfx On Abnormal To Free T4    proBNP    Comprehensive Metabolic Panel    CBC Auto Differential    Essential hypertension      -/64 on repeat, controlled  -Changes as above    Relevant Medications    losartan (COZAAR) 50 MG tablet          Plan of care was reviewed with patient at the conclusion of today's visit. Education was provided regarding diagnoses, management, and the importance of keeping follow-up appointments. The patient was counseled regarding the risks, benefits, and possible side-effects of treatment. Patient and/or family express understanding and agreement with the management plan.        SAMUEL Wilson

## 2020-09-03 LAB — TSH SERPL DL<=0.05 MIU/L-ACNC: 1.98 UIU/ML (ref 0.27–4.2)

## 2020-09-16 NOTE — PROGRESS NOTES
I have reviewed the notes, assessments, and/or procedures performed by Carlos Enrique Jamison, I concur with her/his documentation of Shaka Gurrola.

## 2020-09-23 ENCOUNTER — OFFICE VISIT (OUTPATIENT)
Dept: FAMILY MEDICINE CLINIC | Facility: CLINIC | Age: 75
End: 2020-09-23

## 2020-09-23 VITALS
OXYGEN SATURATION: 98 % | HEIGHT: 72 IN | DIASTOLIC BLOOD PRESSURE: 84 MMHG | HEART RATE: 63 BPM | WEIGHT: 255 LBS | SYSTOLIC BLOOD PRESSURE: 118 MMHG | BODY MASS INDEX: 34.54 KG/M2

## 2020-09-23 DIAGNOSIS — R42 DIZZINESS: ICD-10-CM

## 2020-09-23 DIAGNOSIS — I10 ESSENTIAL HYPERTENSION: Primary | ICD-10-CM

## 2020-09-23 PROCEDURE — 99213 OFFICE O/P EST LOW 20 MIN: CPT | Performed by: PHYSICIAN ASSISTANT

## 2020-09-23 PROCEDURE — 90471 IMMUNIZATION ADMIN: CPT | Performed by: PHYSICIAN ASSISTANT

## 2020-09-23 PROCEDURE — 90694 VACC AIIV4 NO PRSRV 0.5ML IM: CPT | Performed by: PHYSICIAN ASSISTANT

## 2020-09-23 NOTE — PROGRESS NOTES
Chief Complaint   Patient presents with   • Hypertension       HPI     Shaka Gurrola is a 75 y.o. male who is here for 2-week follow-up of dyspnea and hypertension. He states dyspnea and extremity swelling has resolved since amlodipine was changed to losartan at his last appointment. He brings in a home blood pressure log showing quite high readings ranging from 130-182 systolic and 78-98 diastolic. He has dizziness associated with sexual arousal but otherwise feels much better.     Past Medical History:   Diagnosis Date   • Atrophic testicle     Left   • BPH with elevated PSA    • CAD (coronary artery disease)    • ED (erectile dysfunction)    • Hiatal hernia with gastroesophageal reflux    • Hx of iron deficiency anemia    • Inguinal hernia     Right   • Osteoarthritis    • Tubular adenoma of colon        Past Surgical History:   Procedure Laterality Date   • ANKLE SURGERY Left     Plate   • CORONARY ANGIOPLASTY WITH STENT PLACEMENT     • KNEE ARTHROSCOPY Right    • PROSTATE BIOPSY     • TOTAL HIP ARTHROPLASTY Right 01/14/2019    Dr. Fernandez   • TOTAL HIP ARTHROPLASTY REVISION Left        Family History   Problem Relation Age of Onset   • Aneurysm Mother    • Lung cancer Father        Social History     Socioeconomic History   • Marital status:      Spouse name: Not on file   • Number of children: Not on file   • Years of education: Not on file   • Highest education level: Not on file   Tobacco Use   • Smoking status: Never Smoker   • Smokeless tobacco: Never Used   Substance and Sexual Activity   • Alcohol use: Yes   • Drug use: No       No Known Allergies    ROS    Review of Systems   Respiratory: Negative for shortness of breath.    Cardiovascular: Negative for chest pain and leg swelling.   Neurological: Positive for dizziness. Negative for headache.       Vitals:    09/23/20 1015   BP: 118/84   Pulse: 63   SpO2: 98%     Body mass index is 34.58 kg/m².      Current Outpatient Medications:   •  aspirin  81 MG EC tablet, Take 81 mg by mouth Daily., Disp: , Rfl:   •  esomeprazole (nexIUM) 40 MG capsule, Take 40 mg by mouth Every Morning Before Breakfast., Disp: , Rfl:   •  fluticasone (FLONASE) 50 MCG/ACT nasal spray, 1 spray into the nostril(s) as directed by provider Daily., Disp: 1 bottle, Rfl: 0  •  losartan (COZAAR) 50 MG tablet, Take 1 tablet by mouth Daily., Disp: 30 tablet, Rfl: 5  •  meloxicam (MOBIC) 15 MG tablet, Take 15 mg by mouth Daily., Disp: , Rfl:   •  rifaximin (XIFAXAN) 550 MG tablet, Take 1 tablet by mouth Every 8 (Eight) Hours., Disp: 42 tablet, Rfl: 0  •  rosuvastatin (CRESTOR) 20 MG tablet, Take 1 tablet by mouth Daily., Disp: 30 tablet, Rfl: 0  •  sildenafil (REVATIO) 20 MG tablet, Every 8 (Eight) Hours., Disp: , Rfl:   •  sildenafil (VIAGRA) 100 MG tablet, Take 1 tablet by mouth Daily As Needed for erectile dysfunction., Disp: 30 tablet, Rfl: 1    PE    Physical Exam  Vitals signs reviewed.   Constitutional:       General: He is not in acute distress.     Appearance: He is well-developed.   HENT:      Head: Normocephalic and atraumatic.   Eyes:      Conjunctiva/sclera: Conjunctivae normal.   Neck:      Musculoskeletal: Normal range of motion.   Cardiovascular:      Rate and Rhythm: Normal rate and regular rhythm.      Heart sounds: Normal heart sounds. No murmur.   Pulmonary:      Effort: Pulmonary effort is normal.      Breath sounds: Normal breath sounds.   Skin:     General: Skin is warm and dry.   Neurological:      Mental Status: He is alert.      Gait: Gait normal.   Psychiatric:         Speech: Speech normal.         Behavior: Behavior normal.         Results    Results for orders placed or performed in visit on 09/02/20   proBNP    Specimen: Blood   Result Value Ref Range    proBNP 210.9 0.0 - 900.0 pg/mL   TSH Rfx On Abnormal To Free T4    Specimen: Blood   Result Value Ref Range    TSH 1.980 0.270 - 4.200 uIU/mL   Comprehensive Metabolic Panel    Specimen: Blood   Result Value Ref  Range    Glucose 88 65 - 99 mg/dL    BUN 16 8 - 23 mg/dL    Creatinine 0.92 0.76 - 1.27 mg/dL    Sodium 139 136 - 145 mmol/L    Potassium 4.4 3.5 - 5.2 mmol/L    Chloride 107 98 - 107 mmol/L    CO2 24.1 22.0 - 29.0 mmol/L    Calcium 9.4 8.6 - 10.5 mg/dL    Total Protein 7.1 6.0 - 8.5 g/dL    Albumin 3.90 3.50 - 5.20 g/dL    ALT (SGPT) 9 1 - 41 U/L    AST (SGOT) 17 1 - 40 U/L    Alkaline Phosphatase 65 39 - 117 U/L    Total Bilirubin 0.5 0.0 - 1.2 mg/dL    eGFR Non African Amer 81 >60 mL/min/1.73    Globulin 3.2 gm/dL    A/G Ratio 1.2 g/dL    BUN/Creatinine Ratio 17.4 7.0 - 25.0    Anion Gap 7.9 5.0 - 15.0 mmol/L   CBC Auto Differential    Specimen: Blood   Result Value Ref Range    WBC 7.09 3.40 - 10.80 10*3/mm3    RBC 4.91 4.14 - 5.80 10*6/mm3    Hemoglobin 14.9 13.0 - 17.7 g/dL    Hematocrit 43.9 37.5 - 51.0 %    MCV 89.4 79.0 - 97.0 fL    MCH 30.3 26.6 - 33.0 pg    MCHC 33.9 31.5 - 35.7 g/dL    RDW 13.5 12.3 - 15.4 %    RDW-SD 44.1 37.0 - 54.0 fl    MPV 11.1 6.0 - 12.0 fL    Platelets 196 140 - 450 10*3/mm3    Neutrophil % 59.5 42.7 - 76.0 %    Lymphocyte % 24.4 19.6 - 45.3 %    Monocyte % 13.1 (H) 5.0 - 12.0 %    Eosinophil % 2.0 0.3 - 6.2 %    Basophil % 0.6 0.0 - 1.5 %    Immature Grans % 0.4 0.0 - 0.5 %    Neutrophils, Absolute 4.22 1.70 - 7.00 10*3/mm3    Lymphocytes, Absolute 1.73 0.70 - 3.10 10*3/mm3    Monocytes, Absolute 0.93 (H) 0.10 - 0.90 10*3/mm3    Eosinophils, Absolute 0.14 0.00 - 0.40 10*3/mm3    Basophils, Absolute 0.04 0.00 - 0.20 10*3/mm3    Immature Grans, Absolute 0.03 0.00 - 0.05 10*3/mm3    nRBC 0.0 0.0 - 0.2 /100 WBC       A/P    Problem List Items Addressed This Visit        Other    Dizziness  -CTA head normal 8/5/20  -Monitor BP when symptomatic with new cuff      Other Visit Diagnoses     Essential hypertension    -  Primary  -Improvement in symptoms after changed to losartan   -BP controlled in the office with high home readings  -Recommended patient obtain a new home blood pressure  cuff, monitor at home, and report any persistent readings >140/90  -Check bmp after new start of ARB  -RTC in 6 month or sooner if needed    Relevant Orders    Basic Metabolic Panel          Plan of care was reviewed with patient at the conclusion of today's visit. Education was provided regarding diagnoses, management, and the importance of keeping follow-up appointments. The patient was counseled regarding the risks, benefits, and possible side-effects of treatment. Patient and/or family express understanding and agreement with the management plan.        SAMUEL Wilson

## 2021-03-04 RX ORDER — LOSARTAN POTASSIUM 50 MG/1
50 TABLET ORAL DAILY
Qty: 90 TABLET | Refills: 2 | Status: SHIPPED | OUTPATIENT
Start: 2021-03-04 | End: 2021-03-23 | Stop reason: SDUPTHER

## 2021-03-23 ENCOUNTER — OFFICE VISIT (OUTPATIENT)
Dept: FAMILY MEDICINE CLINIC | Facility: CLINIC | Age: 76
End: 2021-03-23

## 2021-03-23 VITALS
HEART RATE: 56 BPM | OXYGEN SATURATION: 97 % | DIASTOLIC BLOOD PRESSURE: 90 MMHG | BODY MASS INDEX: 35.55 KG/M2 | WEIGHT: 262.5 LBS | HEIGHT: 72 IN | SYSTOLIC BLOOD PRESSURE: 150 MMHG

## 2021-03-23 DIAGNOSIS — I10 ESSENTIAL HYPERTENSION: Primary | ICD-10-CM

## 2021-03-23 DIAGNOSIS — I25.10 CORONARY ARTERY DISEASE INVOLVING NATIVE CORONARY ARTERY OF NATIVE HEART WITHOUT ANGINA PECTORIS: ICD-10-CM

## 2021-03-23 DIAGNOSIS — R97.20 ELEVATED PSA: ICD-10-CM

## 2021-03-23 DIAGNOSIS — Z78.9 ALCOHOL USE: ICD-10-CM

## 2021-03-23 PROCEDURE — 99214 OFFICE O/P EST MOD 30 MIN: CPT | Performed by: PHYSICIAN ASSISTANT

## 2021-03-23 RX ORDER — LOSARTAN POTASSIUM 100 MG/1
100 TABLET ORAL DAILY
Qty: 90 TABLET | Refills: 1 | Status: SHIPPED | OUTPATIENT
Start: 2021-03-23 | End: 2021-09-15

## 2021-03-23 NOTE — PATIENT INSTRUCTIONS
-Go to the [facility] [place] lab located at [address] [place] [address] [facility] for fasting blood work. They are open from 8 AM to 4:15 PM Monday through Friday. You do not need an appointment  -Monitor your blood pressure at home and send me a message with your average readings in 2 weeks      Plantar Fasciitis    Plantar fasciitis is a painful foot condition that affects the heel. It occurs when the band of tissue that connects the toes to the heel bone (plantar fascia) becomes irritated. This can happen as the result of exercising too much or doing other repetitive activities (overuse injury).  The pain from plantar fasciitis can range from mild irritation to severe pain that makes it difficult to walk or move. The pain is usually worse in the morning after sleeping, or after sitting or lying down for a while. Pain may also be worse after long periods of walking or standing.  What are the causes?  This condition may be caused by:  · Standing for long periods of time.  · Wearing shoes that do not have good arch support.  · Doing activities that put stress on joints (high-impact activities), including running, aerobics, and ballet.  · Being overweight.  · An abnormal way of walking (gait).  · Tight muscles in the back of your lower leg (calf).  · High arches in your feet.  · Starting a new athletic activity.  What are the signs or symptoms?  The main symptom of this condition is heel pain. Pain may:  · Be worse with first steps after a time of rest, especially in the morning after sleeping or after you have been sitting or lying down for a while.  · Be worse after long periods of standing still.  · Decrease after 30-45 minutes of activity, such as gentle walking.  How is this diagnosed?  This condition may be diagnosed based on your medical history and your symptoms. Your health care provider may ask questions about your activity level. Your health care provider will do a physical exam to check for:  · A tender area  on the bottom of your foot.  · A high arch in your foot.  · Pain when you move your foot.  · Difficulty moving your foot.  You may have imaging tests to confirm the diagnosis, such as:  · X-rays.  · Ultrasound.  · MRI.  How is this treated?  Treatment for plantar fasciitis depends on how severe your condition is. Treatment may include:  · Rest, ice, applying pressure (compression), and raising the affected foot (elevation). This may be called RICE therapy. Your health care provider may recommend RICE therapy along with over-the-counter pain medicines to manage your pain.  · Exercises to stretch your calves and your plantar fascia.  · A splint that holds your foot in a stretched, upward position while you sleep (night splint).  · Physical therapy to relieve symptoms and prevent problems in the future.  · Injections of steroid medicine (cortisone) to relieve pain and inflammation.  · Stimulating your plantar fascia with electrical impulses (extracorporeal shock wave therapy). This is usually the last treatment option before surgery.  · Surgery, if other treatments have not worked after 12 months.  Follow these instructions at home:    Managing pain, stiffness, and swelling  · If directed, put ice on the painful area:  ? Put ice in a plastic bag, or use a frozen bottle of water.  ? Place a towel between your skin and the bag or bottle.  ? Roll the bottom of your foot over the bag or bottle.  ? Do this for 20 minutes, 2-3 times a day.  · Wear athletic shoes that have air-sole or gel-sole cushions, or try wearing soft shoe inserts that are designed for plantar fasciitis.  · Raise (elevate) your foot above the level of your heart while you are sitting or lying down.  Activity  · Avoid activities that cause pain. Ask your health care provider what activities are safe for you.  · Do physical therapy exercises and stretches as told by your health care provider.  · Try activities and forms of exercise that are easier on your  joints (low-impact). Examples include swimming, water aerobics, and biking.  General instructions  · Take over-the-counter and prescription medicines only as told by your health care provider.  · Wear a night splint while sleeping, if told by your health care provider. Loosen the splint if your toes tingle, become numb, or turn cold and blue.  · Maintain a healthy weight, or work with your health care provider to lose weight as needed.  · Keep all follow-up visits as told by your health care provider. This is important.  Contact a health care provider if you:  · Have symptoms that do not go away after caring for yourself at home.  · Have pain that gets worse.  · Have pain that affects your ability to move or do your daily activities.  Summary  · Plantar fasciitis is a painful foot condition that affects the heel. It occurs when the band of tissue that connects the toes to the heel bone (plantar fascia) becomes irritated.  · The main symptom of this condition is heel pain that may be worse after exercising too much or standing still for a long time.  · Treatment varies, but it usually starts with rest, ice, compression, and elevation (RICE therapy) and over-the-counter medicines to manage pain.  This information is not intended to replace advice given to you by your health care provider. Make sure you discuss any questions you have with your health care provider.  Document Revised: 11/30/2018 Document Reviewed: 10/15/2018  [employer] Patient Education © 2021 [employer]    Plantar Fasciitis Rehab  Ask your health care provider which exercises are safe for you. Do exercises exactly as told by your health care provider and adjust them as directed. It is normal to feel mild stretching, pulling, tightness, or discomfort as you do these exercises. Stop right away if you feel sudden pain or your pain gets worse. Do not begin these exercises until told by your health care provider.  Stretching and range-of-motion  exercises  These exercises warm up your muscles and joints and improve the movement and flexibility of your foot. These exercises also help to relieve pain.  Plantar fascia stretch    1. Sit with your left / right leg crossed over your opposite knee.  2. Hold your heel with one hand with that thumb near your arch. With your other hand, hold your toes and gently pull them back toward the top of your foot. You should feel a stretch on the bottom of your toes or your foot (plantar fascia) or both.  3. Hold this stretch for__________ seconds.  4. Slowly release your toes and return to the starting position.  Repeat __________ times. Complete this exercise __________ times a day.  Gastrocnemius stretch, standing  This exercise is also called a calf (gastroc) stretch. It stretches the muscles in the back of the upper calf.  1. Stand with your hands against a wall.  2. Extend your left / right leg behind you, and bend your front knee slightly.  3. Keeping your heels on the floor and your back knee straight, shift your weight toward the wall. Do not arch your back. You should feel a gentle stretch in your upper left / right calf.  4. Hold this position for __________ seconds.  Repeat __________ times. Complete this exercise __________ times a day.  Soleus stretch, standing  This exercise is also called a calf (soleus) stretch. It stretches the muscles in the back of the lower calf.  1. Stand with your hands against a wall.  2. Extend your left / right leg behind you, and bend your front knee slightly.  3. Keeping your heels on the floor, bend your back knee and shift your weight slightly over your back leg. You should feel a gentle stretch deep in your lower calf.  4. Hold this position for __________ seconds.  Repeat __________ times. Complete this exercise __________ times a day.  Gastroc and soleus stretch, standing step  This exercise stretches the muscles in the back of the lower leg. These muscles are in the upper  calf (gastrocnemius) and the lower calf (soleus).  1. Stand with the ball of your left / right foot on a step. The ball of your foot is on the walking surface, right under your toes.  2. Keep your other foot firmly on the same step.  3. Hold on to the wall or a railing for balance.  4. Slowly lift your other foot, allowing your body weight to press your left / right heel down over the edge of the step. You should feel a stretch in your left / right calf.  5. Hold this position for __________ seconds.  6. Return both feet to the step.  7. Repeat this exercise with a slight bend in your left / right knee.  Repeat __________ times with your left / right knee straight and __________ times with your left / right knee bent. Complete this exercise __________ times a day.  Balance exercise  This exercise builds your balance and strength control of your arch to help take pressure off your plantar fascia.  Single leg stand  If this exercise is too easy, you can try it with your eyes closed or while standing on a pillow.  1. Without shoes, stand near a railing or in a doorway. You may hold on to the railing or door frame as needed.  2. Stand on your left / right foot. Keep your big toe down on the floor and try to keep your arch lifted. Do not let your foot roll inward.  3. Hold this position for __________ seconds.  Repeat __________ times. Complete this exercise __________ times a day.  This information is not intended to replace advice given to you by your health care provider. Make sure you discuss any questions you have with your health care provider.  Document Revised: 04/09/2020 Document Reviewed: 10/16/2019  [employer] Patient Education © 2021 [employer]

## 2021-03-23 NOTE — PROGRESS NOTES
Chief Complaint   Patient presents with   • Hypertension     6 mo f/u        HPI     Shaka Gurrola is a 75 y.o. male who is here for 6 month  follow-up of hypertension .   Has gained 10 lbs since last visit and is concerned about increased alcohol intake during COVID-19. States his BP is 140-150/80s but is usually lower in our office. He currently has 4-5 shots vodka/night.  He is compliant with losartan.   He states it has been over 1 year since he last saw urology and had his PSA checked. Sees Dr. Rader. He denies change in urinary symptoms. He requests to have his PSA checked.   He usually sees [facility] for his physical in February but did not have one this year due to the pandemic. He states he plans to schedule this for the fall but declines an earlier physical.   Compliant with crestor. Denies myalgias or muscle weakness.   He has bilateral foot pain when he first puts is feet on the floor in the mornings. This improves as the day progresses. Better since he started wearing slippers rather than walking barefoot.     Past Medical History:   Diagnosis Date   • Atrophic testicle     Left   • BPH with elevated PSA    • CAD (coronary artery disease)    • ED (erectile dysfunction)    • Hiatal hernia with gastroesophageal reflux    • Hx of iron deficiency anemia    • Inguinal hernia     Right   • Osteoarthritis    • Tubular adenoma of colon        Past Surgical History:   Procedure Laterality Date   • ANKLE SURGERY Left     Plate   • CORONARY ANGIOPLASTY WITH STENT PLACEMENT     • KNEE ARTHROSCOPY Right    • PROSTATE BIOPSY     • TOTAL HIP ARTHROPLASTY Right 01/14/2019    Dr. Fernandez   • TOTAL HIP ARTHROPLASTY REVISION Left        Family History   Problem Relation Age of Onset   • Aneurysm Mother    • Lung cancer Father        Social History     Socioeconomic History   • Marital status:      Spouse name: Not on file   • Number of children: Not on file   • Years of education: Not on file   • Highest  education level: Not on file   Tobacco Use   • Smoking status: Never Smoker   • Smokeless tobacco: Never Used   Substance and Sexual Activity   • Alcohol use: Yes   • Drug use: No       Allergies   Allergen Reactions   • Amlodipine Other (See Comments)     Leg swelling, shortness of breath       ROS    Review of Systems   Eyes: Negative for blurred vision.   Respiratory: Negative for shortness of breath.    Cardiovascular: Negative for chest pain.   Musculoskeletal: Negative for myalgias.   Neurological: Negative for dizziness and headache.       Vitals:    03/23/21 1324   BP: 150/90   Pulse:    SpO2:      Body mass index is 35.59 kg/m².      Current Outpatient Medications:   •  aspirin 81 MG EC tablet, Take 81 mg by mouth Daily., Disp: , Rfl:   •  esomeprazole (nexIUM) 40 MG capsule, Take 40 mg by mouth Every Morning Before Breakfast., Disp: , Rfl:   •  fluticasone (FLONASE) 50 MCG/ACT nasal spray, 1 spray into the nostril(s) as directed by provider Daily., Disp: 1 bottle, Rfl: 0  •  losartan (COZAAR) 100 MG tablet, Take 1 tablet by mouth Daily., Disp: 90 tablet, Rfl: 1  •  rifaximin (XIFAXAN) 550 MG tablet, Take 1 tablet by mouth Every 8 (Eight) Hours., Disp: 42 tablet, Rfl: 0  •  rosuvastatin (CRESTOR) 20 MG tablet, Take 1 tablet by mouth Daily., Disp: 30 tablet, Rfl: 0  •  sildenafil (REVATIO) 20 MG tablet, Every 8 (Eight) Hours., Disp: , Rfl:   •  sildenafil (VIAGRA) 100 MG tablet, Take 1 tablet by mouth Daily As Needed for erectile dysfunction., Disp: 30 tablet, Rfl: 1    PE    Physical Exam  Vitals reviewed.   Constitutional:       General: He is not in acute distress.     Appearance: He is well-developed. He is obese.   HENT:      Head: Normocephalic and atraumatic.   Eyes:      Conjunctiva/sclera: Conjunctivae normal.   Cardiovascular:      Rate and Rhythm: Normal rate and regular rhythm.      Heart sounds: Normal heart sounds. No murmur heard.     Pulmonary:      Effort: Pulmonary effort is normal.       Breath sounds: Normal breath sounds.   Musculoskeletal:      Cervical back: Normal range of motion.   Skin:     General: Skin is warm and dry.   Neurological:      Mental Status: He is alert.      Gait: Gait normal.   Psychiatric:         Speech: Speech normal.         Behavior: Behavior normal.         Results    Results for orders placed or performed in visit on 09/02/20   proBNP    Specimen: Blood   Result Value Ref Range    proBNP 210.9 0.0 - 900.0 pg/mL   TSH Rfx On Abnormal To Free T4    Specimen: Blood   Result Value Ref Range    TSH 1.980 0.270 - 4.200 uIU/mL   Comprehensive Metabolic Panel    Specimen: Blood   Result Value Ref Range    Glucose 88 65 - 99 mg/dL    BUN 16 8 - 23 mg/dL    Creatinine 0.92 0.76 - 1.27 mg/dL    Sodium 139 136 - 145 mmol/L    Potassium 4.4 3.5 - 5.2 mmol/L    Chloride 107 98 - 107 mmol/L    CO2 24.1 22.0 - 29.0 mmol/L    Calcium 9.4 8.6 - 10.5 mg/dL    Total Protein 7.1 6.0 - 8.5 g/dL    Albumin 3.90 3.50 - 5.20 g/dL    ALT (SGPT) 9 1 - 41 U/L    AST (SGOT) 17 1 - 40 U/L    Alkaline Phosphatase 65 39 - 117 U/L    Total Bilirubin 0.5 0.0 - 1.2 mg/dL    eGFR Non African Amer 81 >60 mL/min/1.73    Globulin 3.2 gm/dL    A/G Ratio 1.2 g/dL    BUN/Creatinine Ratio 17.4 7.0 - 25.0    Anion Gap 7.9 5.0 - 15.0 mmol/L   CBC Auto Differential    Specimen: Blood   Result Value Ref Range    WBC 7.09 3.40 - 10.80 10*3/mm3    RBC 4.91 4.14 - 5.80 10*6/mm3    Hemoglobin 14.9 13.0 - 17.7 g/dL    Hematocrit 43.9 37.5 - 51.0 %    MCV 89.4 79.0 - 97.0 fL    MCH 30.3 26.6 - 33.0 pg    MCHC 33.9 31.5 - 35.7 g/dL    RDW 13.5 12.3 - 15.4 %    RDW-SD 44.1 37.0 - 54.0 fl    MPV 11.1 6.0 - 12.0 fL    Platelets 196 140 - 450 10*3/mm3    Neutrophil % 59.5 42.7 - 76.0 %    Lymphocyte % 24.4 19.6 - 45.3 %    Monocyte % 13.1 (H) 5.0 - 12.0 %    Eosinophil % 2.0 0.3 - 6.2 %    Basophil % 0.6 0.0 - 1.5 %    Immature Grans % 0.4 0.0 - 0.5 %    Neutrophils, Absolute 4.22 1.70 - 7.00 10*3/mm3    Lymphocytes, Absolute  1.73 0.70 - 3.10 10*3/mm3    Monocytes, Absolute 0.93 (H) 0.10 - 0.90 10*3/mm3    Eosinophils, Absolute 0.14 0.00 - 0.40 10*3/mm3    Basophils, Absolute 0.04 0.00 - 0.20 10*3/mm3    Immature Grans, Absolute 0.03 0.00 - 0.05 10*3/mm3    nRBC 0.0 0.0 - 0.2 /100 WBC       A/P    Problem List Items Addressed This Visit        Cardiac and Vasculature    Coronary artery disease    Overview     2013: stents x2  Followed by [facility] cardiology, CORY Avalos  Currently asymptomatic, monitor fasting lipid profile       Relevant Orders    Comprehensive Metabolic Panel    Lipid Panel       Genitourinary and Reproductive     Elevated PSA    Overview     -Followed by urology, Dr. Rader but over 1 year since his last visit  -No urinary complaints today  -Encouraged urology follow-up. Will order PSA level today       Relevant Orders    PSA DIAGNOSTIC ONLY      Other Visit Diagnoses     Essential hypertension    -  Primary  -/90 for me on repeat which is consistent with his elevated home readings.   -Increase losartan to 100 mg qd  -Recommended reducing alcohol intake, weight reduction, routine physical activity as tolerated  -Patient declines recommended 1 month follow-up and requests 6-month follow-up. He agrees to send me a message with his home readings in 2 weeks.    Relevant Medications    losartan (COZAAR) 100 MG tablet    Bilateral foot pain  -Discussed trying plantar fasciitis exercises    Alcohol use     -Reducing intake advised           Plan of care was reviewed with patient at the conclusion of today's visit. Education was provided regarding diagnoses, management, and the importance of keeping follow-up appointments. The patient was counseled regarding the risks, benefits, and possible side-effects of treatment. Patient and/or family express understanding and agreement with the management plan.        SAMUEL Wilson

## 2021-03-25 ENCOUNTER — LAB (OUTPATIENT)
Dept: LAB | Facility: HOSPITAL | Age: 76
End: 2021-03-25

## 2021-03-25 DIAGNOSIS — I10 ESSENTIAL HYPERTENSION: ICD-10-CM

## 2021-03-25 DIAGNOSIS — I25.10 CORONARY ARTERY DISEASE INVOLVING NATIVE CORONARY ARTERY OF NATIVE HEART WITHOUT ANGINA PECTORIS: ICD-10-CM

## 2021-03-25 DIAGNOSIS — R97.20 ELEVATED PSA: ICD-10-CM

## 2021-03-25 LAB
ALBUMIN SERPL-MCNC: 3.9 G/DL (ref 3.5–5.2)
ALBUMIN/GLOB SERPL: 1.3 G/DL
ALP SERPL-CCNC: 59 U/L (ref 39–117)
ALT SERPL W P-5'-P-CCNC: 8 U/L (ref 1–41)
ANION GAP SERPL CALCULATED.3IONS-SCNC: 7.3 MMOL/L (ref 5–15)
AST SERPL-CCNC: 20 U/L (ref 1–40)
BILIRUB SERPL-MCNC: 0.7 MG/DL (ref 0–1.2)
BUN SERPL-MCNC: 17 MG/DL (ref 8–23)
BUN/CREAT SERPL: 14.8 (ref 7–25)
CALCIUM SPEC-SCNC: 8.8 MG/DL (ref 8.6–10.5)
CHLORIDE SERPL-SCNC: 105 MMOL/L (ref 98–107)
CHOLEST SERPL-MCNC: 125 MG/DL (ref 0–200)
CO2 SERPL-SCNC: 26.7 MMOL/L (ref 22–29)
CREAT SERPL-MCNC: 1.15 MG/DL (ref 0.76–1.27)
GFR SERPL CREATININE-BSD FRML MDRD: 62 ML/MIN/1.73
GLOBULIN UR ELPH-MCNC: 2.9 GM/DL
GLUCOSE SERPL-MCNC: 96 MG/DL (ref 65–99)
HDLC SERPL-MCNC: 71 MG/DL (ref 40–60)
LDLC SERPL CALC-MCNC: 44 MG/DL (ref 0–100)
LDLC/HDLC SERPL: 0.65 {RATIO}
POTASSIUM SERPL-SCNC: 4.8 MMOL/L (ref 3.5–5.2)
PROT SERPL-MCNC: 6.8 G/DL (ref 6–8.5)
PSA SERPL-MCNC: 10.9 NG/ML (ref 0–4)
SODIUM SERPL-SCNC: 139 MMOL/L (ref 136–145)
TRIGL SERPL-MCNC: 38 MG/DL (ref 0–150)
VLDLC SERPL-MCNC: 10 MG/DL (ref 5–40)

## 2021-03-25 PROCEDURE — 80061 LIPID PANEL: CPT

## 2021-03-25 PROCEDURE — 84153 ASSAY OF PSA TOTAL: CPT

## 2021-03-25 PROCEDURE — 80053 COMPREHEN METABOLIC PANEL: CPT

## 2021-03-25 PROCEDURE — 36415 COLL VENOUS BLD VENIPUNCTURE: CPT

## 2021-09-15 RX ORDER — LOSARTAN POTASSIUM 100 MG/1
100 TABLET ORAL DAILY
Qty: 90 TABLET | Refills: 1 | Status: SHIPPED | OUTPATIENT
Start: 2021-09-15 | End: 2021-12-21

## 2021-09-15 NOTE — TELEPHONE ENCOUNTER
Rx Refill Note  Requested Prescriptions     Pending Prescriptions Disp Refills   • losartan (COZAAR) 100 MG tablet [Pharmacy Med Name: LOSARTAN 100MG TABLETS] 90 tablet 1     Sig: TAKE 1 TABLET BY MOUTH DAILY      Last office visit with prescribing clinician: 3/23/2021      Next office visit with prescribing clinician: 9/23/2021            WESTON VELÁSQUEZ MA  09/15/21, 12:35 EDT

## 2021-09-24 ENCOUNTER — OFFICE VISIT (OUTPATIENT)
Dept: FAMILY MEDICINE CLINIC | Facility: CLINIC | Age: 76
End: 2021-09-24

## 2021-09-24 DIAGNOSIS — M15.9 PRIMARY OSTEOARTHRITIS INVOLVING MULTIPLE JOINTS: ICD-10-CM

## 2021-09-24 DIAGNOSIS — R97.20 ELEVATED PSA: ICD-10-CM

## 2021-09-24 DIAGNOSIS — Z86.010 HISTORY OF COLON POLYPS: ICD-10-CM

## 2021-09-24 DIAGNOSIS — L98.9 SKIN LESION OF FACE: ICD-10-CM

## 2021-09-24 DIAGNOSIS — E78.5 HYPERLIPIDEMIA, UNSPECIFIED HYPERLIPIDEMIA TYPE: ICD-10-CM

## 2021-09-24 DIAGNOSIS — T16.1XXA FOREIGN BODY OF RIGHT EAR, INITIAL ENCOUNTER: ICD-10-CM

## 2021-09-24 DIAGNOSIS — Z00.00 MEDICARE ANNUAL WELLNESS VISIT, SUBSEQUENT: Primary | ICD-10-CM

## 2021-09-24 DIAGNOSIS — K58.0 IRRITABLE BOWEL SYNDROME WITH DIARRHEA: ICD-10-CM

## 2021-09-24 DIAGNOSIS — E66.01 CLASS 2 SEVERE OBESITY DUE TO EXCESS CALORIES WITH SERIOUS COMORBIDITY AND BODY MASS INDEX (BMI) OF 35.0 TO 35.9 IN ADULT (HCC): ICD-10-CM

## 2021-09-24 DIAGNOSIS — I10 HYPERTENSION, UNSPECIFIED TYPE: ICD-10-CM

## 2021-09-24 DIAGNOSIS — I25.10 CORONARY ARTERY DISEASE INVOLVING NATIVE CORONARY ARTERY OF NATIVE HEART WITHOUT ANGINA PECTORIS: ICD-10-CM

## 2021-09-24 PROCEDURE — G0402 INITIAL PREVENTIVE EXAM: HCPCS | Performed by: PHYSICIAN ASSISTANT

## 2021-09-24 RX ORDER — SILDENAFIL 100 MG/1
100 TABLET, FILM COATED ORAL DAILY PRN
Qty: 30 TABLET | Refills: 5 | Status: SHIPPED | OUTPATIENT
Start: 2021-09-24 | End: 2023-02-03 | Stop reason: SDUPTHER

## 2021-09-24 NOTE — PROGRESS NOTES
QUICK REFERENCE INFORMATION:  The ABCs of the Annual Wellness Visit    Medicare Subsequent Wellness Visit    Chief Complaint   Patient presents with   • Medicare Wellness-subsequent   • Hypertension        HPI     Shaka Gurrola is a 76 y.o. male presenting for subsequent annual wellness visit. Hx CAD, IBS, BPH, elevated PSA, osteoarthritis.     He has diffuse joint stiffness particularly after inactivity. He states this loosens up after a minute or two when he gets going. He is taking Aleve OTC 2 in the morning and 1 at night. He has a follow-up appointment with his cardiologist at the [facility] later this month. Patient did see Dr. Rader this year for his elevated PSA. He will have this reassessed in 1 year. Colonoscopy last year - normal. He states he received pneumonia vaccine a couple of years ago through his employer. He states he weighed 258 pounds today.        Past Medical History:   Diagnosis Date   • Atrophic testicle     Left   • BPH with elevated PSA    • Hiatal hernia with gastroesophageal reflux    • Hx of iron deficiency anemia    • Inguinal hernia     Right      Past Surgical History:   Procedure Laterality Date   • ANKLE SURGERY Left     Plate   • CORONARY ANGIOPLASTY WITH STENT PLACEMENT     • KNEE ARTHROSCOPY Right    • PROSTATE BIOPSY     • TOTAL HIP ARTHROPLASTY Right 01/14/2019    Dr. Fernandez   • TOTAL HIP ARTHROPLASTY REVISION Left      Family History   Problem Relation Age of Onset   • Aneurysm Mother    • Lung cancer Father       Social History     Socioeconomic History   • Marital status:      Spouse name: Not on file   • Number of children: Not on file   • Years of education: Not on file   • Highest education level: Not on file   Tobacco Use   • Smoking status: Never Smoker   • Smokeless tobacco: Never Used   Vaping Use   • Vaping Use: Never used   Substance and Sexual Activity   • Alcohol use: Yes   • Drug use: No   • Sexual activity: Defer      Allergies   Allergen Reactions    • Amlodipine Other (See Comments)     Leg swelling, shortness of breath      Outpatient Medications Prior to Visit   Medication Sig Dispense Refill   • aspirin 81 MG EC tablet Take 81 mg by mouth Daily.     • esomeprazole (nexIUM) 40 MG capsule Take 40 mg by mouth Every Morning Before Breakfast.     • fluticasone (FLONASE) 50 MCG/ACT nasal spray 1 spray into the nostril(s) as directed by provider Daily. 1 bottle 0   • losartan (COZAAR) 100 MG tablet TAKE 1 TABLET BY MOUTH DAILY 90 tablet 1   • rifaximin (XIFAXAN) 550 MG tablet Take 1 tablet by mouth Every 8 (Eight) Hours. 42 tablet 0   • rosuvastatin (CRESTOR) 20 MG tablet Take 1 tablet by mouth Daily. 30 tablet 0   • sildenafil (REVATIO) 20 MG tablet Every 8 (Eight) Hours.     • sildenafil (VIAGRA) 100 MG tablet Take 1 tablet by mouth Daily As Needed for erectile dysfunction. 30 tablet 1     No facility-administered medications prior to visit.       Reviewed use of high risk medication in the elderly: yes  Reviewed for potential of harmful drug interactions in the elderly: yes    The following portions of the patient's history were reviewed and updated as appropriate: allergies, current medications, past family history, past medical history, past social history, past surgical history and problem list.    Review of Systems   Constitutional: Negative for appetite change, chills, diaphoresis, fatigue and fever.   HENT: Negative for congestion, hearing loss, sore throat, swollen glands and trouble swallowing.    Eyes: Negative for blurred vision and visual disturbance.   Respiratory: Negative for apnea, cough, choking, shortness of breath and wheezing.    Cardiovascular: Negative for chest pain, palpitations and leg swelling.   Gastrointestinal: Negative for abdominal pain, blood in stool, constipation, diarrhea and GERD.   Endocrine: Negative for polydipsia and polyuria.   Genitourinary: Positive for nocturia (1x, stable) and scrotal swelling (hydrocele on right,  "stable). Negative for difficulty urinating, dysuria, hematuria and testicular pain.   Musculoskeletal: Positive for arthralgias and myalgias.   Skin: Negative for rash and skin lesions.   Neurological: Negative for dizziness, syncope, numbness and headache.   Hematological: Negative for adenopathy.   Psychiatric/Behavioral: Negative for sleep disturbance and depressed mood. The patient is not nervous/anxious.         Vitals:    09/24/21 0815 09/30/21 0724   BP: 128/80    Pulse: 59    Resp: 16    Temp: 96.9 °F (36.1 °C)    SpO2: 95%    Weight: 136 kg (298 lb 12.8 oz) 117 kg (258 lb)   Height: 180.3 cm (71\")    PainSc: 0-No pain      Body mass index is 35.98 kg/m².    Objective    Physical Exam  Vitals reviewed.   Constitutional:       General: He is not in acute distress.     Appearance: He is well-developed. He is obese.   HENT:      Head: Normocephalic and atraumatic.        Right Ear: Hearing normal. A foreign body is present.      Left Ear: Hearing and tympanic membrane normal.      Ears:      Comments: Foreign body noted in right external auditory canal. Appears to be plastic tip of hearing aid      Mouth/Throat:      Mouth: Mucous membranes are moist.      Dentition: Normal dentition.      Pharynx: Oropharynx is clear.   Eyes:      Extraocular Movements: Extraocular movements intact.      Conjunctiva/sclera: Conjunctivae normal.      Pupils: Pupils are equal, round, and reactive to light.      Comments:      Neck:      Thyroid: No thyroid mass or thyromegaly.      Vascular: No carotid bruit.   Cardiovascular:      Rate and Rhythm: Normal rate and regular rhythm.      Heart sounds: No murmur heard.   No friction rub.   Pulmonary:      Effort: Pulmonary effort is normal.      Breath sounds: Normal breath sounds.   Abdominal:      General: Bowel sounds are normal. There is no abdominal bruit.      Palpations: Abdomen is soft. There is no mass.      Tenderness: There is no abdominal tenderness.   Musculoskeletal:   "       General: Normal range of motion.      Cervical back: Normal range of motion and neck supple.   Lymphadenopathy:      Cervical: No cervical adenopathy.      Upper Body:      Right upper body: No supraclavicular adenopathy.      Left upper body: No supraclavicular adenopathy.   Skin:     General: Skin is warm and dry.      Findings: No rash.      Nails: There is no clubbing.      Comments: No suspicious nevi   Neurological:      Mental Status: He is alert.      Gait: Gait normal.      Deep Tendon Reflexes: Reflexes normal.   Psychiatric:         Speech: Speech normal.         Behavior: Behavior normal.          HEALTH RISK ASSESSMENT    1945    Recent Hospitalizations:  No hospitalization(s) within the last year..      Current Medical Providers:  Patient Care Team:  Carlos Enrique Jamison PA as PCP - General (Physician Assistant)      Smoking Status:  Social History     Tobacco Use   Smoking Status Never Smoker   Smokeless Tobacco Never Used       Alcohol Consumption:  Social History     Substance and Sexual Activity   Alcohol Use Yes       Depression Screen:   PHQ-2/PHQ-9 Depression Screening 9/24/2021   Little interest or pleasure in doing things 0   Feeling down, depressed, or hopeless 0   Trouble falling or staying asleep, or sleeping too much -   Feeling tired or having little energy -   Poor appetite or overeating -   Feeling bad about yourself - or that you are a failure or have let yourself or your family down -   Trouble concentrating on things, such as reading the newspaper or watching television -   Moving or speaking so slowly that other people could have noticed. Or the opposite - being so fidgety or restless that you have been moving around a lot more than usual -   Thoughts that you would be better off dead, or of hurting yourself in some way -   Total Score 0       Health Habits and Functional and Cognitive Screening:  Functional & Cognitive Status 9/24/2021   Do you have difficulty preparing food  and eating? No   Do you have difficulty bathing yourself, getting dressed or grooming yourself? No   Do you have difficulty using the toilet? No   Do you have difficulty moving around from place to place? Yes   Do you have trouble with steps or getting out of a bed or a chair? No   Current Diet Well Balanced Diet   Dental Exam Up to date   Eye Exam Up to date   Exercise (times per week) 3 times per week   Current Exercises Include Walking   Do you need help using the phone?  No   Are you deaf or do you have serious difficulty hearing?  No   Do you need help with transportation? No   Do you need help shopping? No   Do you need help preparing meals?  No   Do you need help with housework?  No   Do you need help with laundry? No   Do you need help taking your medications? No   Do you need help managing money? No   Do you ever drive or ride in a car without wearing a seat belt? No   Have you felt unusual stress, anger or loneliness in the last month? No   Who do you live with? Spouse   If you need help, do you have trouble finding someone available to you? No   Have you been bothered in the last four weeks by sexual problems? No   Do you have difficulty concentrating, remembering or making decisions? No           Does the patient have evidence of cognitive impairment? No    Aspirin use counseling? Taking ASA appropriately as indicated      Recent Lab Results:  CMP:  Lab Results   Component Value Date    BUN 17 03/25/2021    CREATININE 1.15 03/25/2021    EGFRIFNONA 62 03/25/2021    EGFRIFAFRI 86 02/20/2020    BCR 14.8 03/25/2021     03/25/2021    K 4.8 03/25/2021    CO2 26.7 03/25/2021    CALCIUM 8.8 03/25/2021    ALBUMIN 3.90 03/25/2021    LABIL2 0.95 02/21/2020    BILITOT 0.7 03/25/2021    ALKPHOS 59 03/25/2021    AST 20 03/25/2021    ALT 8 03/25/2021     Lipid Panel:  Lab Results   Component Value Date    CHOL 125 03/25/2021    TRIG 38 03/25/2021    HDL 71 (H) 03/25/2021    VLDL 10 03/25/2021    LDLHDL 0.65  03/25/2021     HbA1c:       Visual Acuity:  No exam data present    Age-appropriate Screening Schedule:  Refer to the list below for future screening recommendations based on patient's age, sex and/or medical conditions. Orders for these recommended tests are listed in the plan section. The patient has been provided with a written plan.    Health Maintenance   Topic Date Due   • ZOSTER VACCINE (3 of 3) 11/30/2019   • INFLUENZA VACCINE  10/01/2021   • LIPID PANEL  03/25/2022   • TDAP/TD VACCINES (3 - Td or Tdap) 10/04/2027          Advance Care Planning:  ACP discussion was held with the patient during this visit. Patient has an advance directive (not in EMR), copy requested.    Identification of Risk Factors:  Risk factors include: Cardiovascular risk  Immunizations Discussed/Encouraged (specific immunizations; Shingrix )  Obesity/Overweight .    Compared to one year ago, the patient feels his physical health is the same.  Compared to one year ago, the patient feels his mental health is the same.      Diagnoses and all orders for this visit:    1. Medicare annual wellness visit, subsequent (Primary)  -Counseled patient regarding cancer screening, immunizations, healthy lifestyle, diet, maintaining a healthy weight, and exercise.  -Annual dental, eye exams were recommended.     2. Coronary artery disease involving native coronary artery of native heart without angina pectoris  -Presently asymptomatic  -Continue management per cardiology - patient sees provider at [facility]    3. History of colon polyps  -Current with surveillance colonoscopy    4. Elevated PSA  -Monitored by urology    5. Primary osteoarthritis involving multiple joints  -NSAIDs improve joint pain  -Recommended patient discuss daily NSAID use at his upcoming cardiology appointment    6. Irritable bowel syndrome with diarrhea  -Stable    7. Skin lesion of face  -     Ambulatory Referral to Dermatology    8. Foreign body of right ear, initial  encounter  -Unable to remove in office with curette  -Refer to ENT for removal  -     Ambulatory Referral to ENT (Otolaryngology)    9. Hyperlipidemia, unspecified hyperlipidemia type  -Monitor lipid profile  -     Comprehensive Metabolic Panel; Future  -     Lipid Panel; Future  -     TSH Rfx On Abnormal To Free T4; Future    10. Hypertension, unspecified type  -Controlled  -Continue losartan  -RTC in 6 months for HTN follow-up or sooner if needed  -     CBC (No Diff); Future  -     Urinalysis With Culture If Indicated - Urine, Clean Catch; Future    11. Class 2 severe obesity due to excess calories with serious comorbidity and body mass index (BMI) of 35.0 to 35.9 in adult (HCC)  -Patient's Body mass index is 35.98 kg/m². indicating that he is obese (BMI >30). Obesity-related health conditions include the following: hypertension, coronary heart disease, dyslipidemias and osteoarthritis. Obesity is unchanged. BMI is is above average; BMI management plan is completed. We discussed low calorie, low carb based diet program, portion control and increasing exercise.    Other orders  -     sildenafil (Viagra) 100 MG tablet; Take 1 tablet by mouth Daily As Needed for Erectile Dysfunction.  Dispense: 30 tablet; Refill: 5        Procedure   Procedures       Patient Self-Management and Personalized Health Advice  The patient has been provided with information about: diet, exercise and weight management and preventive services including:   · Annual Wellness Visit (AWV).      Follow Up:  Return in about 6 months (around 3/24/2022), or if symptoms worsen or fail to improve, for Follow-up chronic conditions.     An After Visit Summary and PPPS with all of these plans were given to the patient.

## 2021-09-30 VITALS
HEART RATE: 59 BPM | BODY MASS INDEX: 36.12 KG/M2 | TEMPERATURE: 96.9 F | HEIGHT: 71 IN | DIASTOLIC BLOOD PRESSURE: 80 MMHG | OXYGEN SATURATION: 95 % | SYSTOLIC BLOOD PRESSURE: 128 MMHG | RESPIRATION RATE: 16 BRPM | WEIGHT: 258 LBS

## 2021-09-30 PROBLEM — E66.01 CLASS 2 SEVERE OBESITY DUE TO EXCESS CALORIES WITH SERIOUS COMORBIDITY AND BODY MASS INDEX (BMI) OF 35.0 TO 35.9 IN ADULT (HCC): Status: ACTIVE | Noted: 2021-09-30

## 2021-09-30 PROBLEM — E66.812 CLASS 2 SEVERE OBESITY DUE TO EXCESS CALORIES WITH SERIOUS COMORBIDITY AND BODY MASS INDEX (BMI) OF 35.0 TO 35.9 IN ADULT: Status: ACTIVE | Noted: 2021-09-30

## 2021-10-01 ENCOUNTER — LAB (OUTPATIENT)
Dept: LAB | Facility: HOSPITAL | Age: 76
End: 2021-10-01

## 2021-10-01 DIAGNOSIS — I10 HYPERTENSION, UNSPECIFIED TYPE: ICD-10-CM

## 2021-10-01 DIAGNOSIS — E78.5 HYPERLIPIDEMIA, UNSPECIFIED HYPERLIPIDEMIA TYPE: ICD-10-CM

## 2021-10-01 LAB
ALBUMIN SERPL-MCNC: 4.4 G/DL (ref 3.5–5.2)
ALBUMIN/GLOB SERPL: 1.6 G/DL
ALP SERPL-CCNC: 67 U/L (ref 39–117)
ALT SERPL W P-5'-P-CCNC: 10 U/L (ref 1–41)
ANION GAP SERPL CALCULATED.3IONS-SCNC: 12.1 MMOL/L (ref 5–15)
AST SERPL-CCNC: 18 U/L (ref 1–40)
BILIRUB SERPL-MCNC: 0.7 MG/DL (ref 0–1.2)
BILIRUB UR QL STRIP: NEGATIVE
BUN SERPL-MCNC: 20 MG/DL (ref 8–23)
BUN/CREAT SERPL: 22 (ref 7–25)
CALCIUM SPEC-SCNC: 9.2 MG/DL (ref 8.6–10.5)
CHLORIDE SERPL-SCNC: 102 MMOL/L (ref 98–107)
CHOLEST SERPL-MCNC: 133 MG/DL (ref 0–200)
CLARITY UR: CLEAR
CO2 SERPL-SCNC: 23.9 MMOL/L (ref 22–29)
COLOR UR: YELLOW
CREAT SERPL-MCNC: 0.91 MG/DL (ref 0.76–1.27)
DEPRECATED RDW RBC AUTO: 41.9 FL (ref 37–54)
ERYTHROCYTE [DISTWIDTH] IN BLOOD BY AUTOMATED COUNT: 12.4 % (ref 12.3–15.4)
GFR SERPL CREATININE-BSD FRML MDRD: 81 ML/MIN/1.73
GLOBULIN UR ELPH-MCNC: 2.8 GM/DL
GLUCOSE SERPL-MCNC: 87 MG/DL (ref 65–99)
GLUCOSE UR STRIP-MCNC: NEGATIVE MG/DL
HCT VFR BLD AUTO: 45.8 % (ref 37.5–51)
HDLC SERPL-MCNC: 68 MG/DL (ref 40–60)
HGB BLD-MCNC: 15.5 G/DL (ref 13–17.7)
HGB UR QL STRIP.AUTO: NEGATIVE
KETONES UR QL STRIP: NEGATIVE
LDLC SERPL CALC-MCNC: 53 MG/DL (ref 0–100)
LDLC/HDLC SERPL: 0.79 {RATIO}
LEUKOCYTE ESTERASE UR QL STRIP.AUTO: NEGATIVE
MCH RBC QN AUTO: 31.4 PG (ref 26.6–33)
MCHC RBC AUTO-ENTMCNC: 33.8 G/DL (ref 31.5–35.7)
MCV RBC AUTO: 92.9 FL (ref 79–97)
NITRITE UR QL STRIP: NEGATIVE
PH UR STRIP.AUTO: 6.5 [PH] (ref 5–8)
PLATELET # BLD AUTO: 195 10*3/MM3 (ref 140–450)
PMV BLD AUTO: 10.5 FL (ref 6–12)
POTASSIUM SERPL-SCNC: 4.6 MMOL/L (ref 3.5–5.2)
PROT SERPL-MCNC: 7.2 G/DL (ref 6–8.5)
PROT UR QL STRIP: NEGATIVE
RBC # BLD AUTO: 4.93 10*6/MM3 (ref 4.14–5.8)
SODIUM SERPL-SCNC: 138 MMOL/L (ref 136–145)
SP GR UR STRIP: 1.02 (ref 1–1.03)
TRIGL SERPL-MCNC: 57 MG/DL (ref 0–150)
TSH SERPL DL<=0.05 MIU/L-ACNC: 2.38 UIU/ML (ref 0.27–4.2)
UROBILINOGEN UR QL STRIP: NORMAL
VLDLC SERPL-MCNC: 12 MG/DL (ref 5–40)
WBC # BLD AUTO: 8.25 10*3/MM3 (ref 3.4–10.8)

## 2021-10-01 PROCEDURE — 80061 LIPID PANEL: CPT

## 2021-10-01 PROCEDURE — 80053 COMPREHEN METABOLIC PANEL: CPT

## 2021-10-01 PROCEDURE — 81003 URINALYSIS AUTO W/O SCOPE: CPT

## 2021-10-01 PROCEDURE — 84443 ASSAY THYROID STIM HORMONE: CPT

## 2021-10-01 PROCEDURE — 85027 COMPLETE CBC AUTOMATED: CPT

## 2021-11-24 PROBLEM — Z85.828 HISTORY OF BASAL CELL CARCINOMA (BCC): Status: ACTIVE | Noted: 2021-11-24

## 2021-12-21 RX ORDER — LOSARTAN POTASSIUM 100 MG/1
100 TABLET ORAL DAILY
Qty: 90 TABLET | Refills: 1 | Status: SHIPPED | OUTPATIENT
Start: 2021-12-21 | End: 2022-06-29

## 2021-12-21 NOTE — TELEPHONE ENCOUNTER
Rx Refill Note  Requested Prescriptions     Pending Prescriptions Disp Refills   • losartan (COZAAR) 100 MG tablet [Pharmacy Med Name: LOSARTAN 100MG TABLETS] 90 tablet 1     Sig: TAKE 1 TABLET BY MOUTH DAILY      Last office visit with prescribing clinician: 9/24/2021      Next office visit with prescribing clinician: 3/24/2022            WESTON VELÁSQUEZ MA  12/21/21, 09:27 EST

## 2022-03-17 ENCOUNTER — OFFICE VISIT (OUTPATIENT)
Dept: FAMILY MEDICINE CLINIC | Facility: CLINIC | Age: 77
End: 2022-03-17

## 2022-03-17 VITALS
WEIGHT: 243 LBS | DIASTOLIC BLOOD PRESSURE: 80 MMHG | HEIGHT: 71 IN | OXYGEN SATURATION: 95 % | SYSTOLIC BLOOD PRESSURE: 130 MMHG | RESPIRATION RATE: 16 BRPM | BODY MASS INDEX: 34.02 KG/M2 | HEART RATE: 70 BPM | TEMPERATURE: 97.3 F

## 2022-03-17 DIAGNOSIS — J20.9 ACUTE BRONCHITIS, UNSPECIFIED ORGANISM: Primary | ICD-10-CM

## 2022-03-17 PROCEDURE — U0005 INFEC AGEN DETEC AMPLI PROBE: HCPCS | Performed by: PHYSICIAN ASSISTANT

## 2022-03-17 PROCEDURE — 99213 OFFICE O/P EST LOW 20 MIN: CPT | Performed by: PHYSICIAN ASSISTANT

## 2022-03-17 PROCEDURE — U0004 COV-19 TEST NON-CDC HGH THRU: HCPCS | Performed by: PHYSICIAN ASSISTANT

## 2022-03-17 RX ORDER — DEXTROMETHORPHAN HYDROBROMIDE AND PROMETHAZINE HYDROCHLORIDE 15; 6.25 MG/5ML; MG/5ML
5 SYRUP ORAL 4 TIMES DAILY PRN
Qty: 240 ML | Refills: 0 | Status: SHIPPED | OUTPATIENT
Start: 2022-03-17 | End: 2022-05-16 | Stop reason: SDUPTHER

## 2022-03-17 RX ORDER — AMLODIPINE BESYLATE 5 MG/1
5 TABLET ORAL DAILY
COMMUNITY
Start: 2022-01-18 | End: 2022-09-26

## 2022-03-17 RX ORDER — METHYLPREDNISOLONE 4 MG/1
TABLET ORAL
Qty: 21 TABLET | Refills: 0 | Status: SHIPPED | OUTPATIENT
Start: 2022-03-17 | End: 2022-09-26

## 2022-03-17 RX ORDER — ALBUTEROL SULFATE 90 UG/1
2 AEROSOL, METERED RESPIRATORY (INHALATION) EVERY 4 HOURS PRN
Qty: 18 G | Refills: 0 | Status: SHIPPED | OUTPATIENT
Start: 2022-03-17 | End: 2022-09-26

## 2022-03-17 RX ORDER — GUAIFENESIN 600 MG/1
1200 TABLET, EXTENDED RELEASE ORAL 2 TIMES DAILY
Qty: 30 TABLET | Refills: 0 | Status: SHIPPED | OUTPATIENT
Start: 2022-03-17 | End: 2022-05-16 | Stop reason: SDUPTHER

## 2022-03-17 RX ORDER — TADALAFIL 5 MG/1
TABLET ORAL
COMMUNITY
Start: 2022-01-19 | End: 2023-02-03 | Stop reason: SDUPTHER

## 2022-03-17 RX ORDER — SEMAGLUTIDE 1.34 MG/ML
INJECTION, SOLUTION SUBCUTANEOUS
COMMUNITY
Start: 2022-02-21 | End: 2022-05-16 | Stop reason: SDUPTHER

## 2022-03-17 RX ORDER — AZITHROMYCIN 250 MG/1
TABLET, FILM COATED ORAL
Qty: 6 TABLET | Refills: 0 | Status: SHIPPED | OUTPATIENT
Start: 2022-03-17 | End: 2022-09-26

## 2022-03-17 NOTE — PROGRESS NOTES
"    Chief Complaint   Patient presents with   • Cough     X 4 days (negative at home covid test 3-14-22 & 3-17-22)   • Wheezing      2 days   • crackling in chest     X 3-4 days     • possible pneumonia       HPI     Shaka Gurrola is a pleasant 76 y.o. male who presents for evaluation of \"chief complaint.\"     Patient c/o 4-day history of productive cough, wheezing. He denies known sick exposures. He was in [place], [place] over the weekend to see grandchildren. Using robutussin but does not stop cough. He is leaving on a cruise in about 1 week.     Past Medical History:   Diagnosis Date   • Atrophic testicle     Left   • BPH with elevated PSA    • Hiatal hernia with gastroesophageal reflux    • Hx of iron deficiency anemia    • Inguinal hernia     Right       Past Surgical History:   Procedure Laterality Date   • ANKLE SURGERY Left     Plate   • CORONARY ANGIOPLASTY WITH STENT PLACEMENT     • KNEE ARTHROSCOPY Right    • PROSTATE BIOPSY     • TOTAL HIP ARTHROPLASTY Right 01/14/2019    Dr. Fernandez   • TOTAL HIP ARTHROPLASTY REVISION Left        Family History   Problem Relation Age of Onset   • Aneurysm Mother    • Lung cancer Father        Social History     Socioeconomic History   • Marital status:    Tobacco Use   • Smoking status: Never Smoker   • Smokeless tobacco: Never Used   Vaping Use   • Vaping Use: Never used   Substance and Sexual Activity   • Alcohol use: Yes   • Drug use: No   • Sexual activity: Defer       Allergies   Allergen Reactions   • Amlodipine Other (See Comments)     Leg swelling, shortness of breath       ROS    Review of Systems   Constitutional: Negative for chills and fever.   HENT: Positive for congestion. Negative for postnasal drip and rhinorrhea.    Respiratory: Positive for cough and wheezing. Negative for shortness of breath.    Cardiovascular: Negative for chest pain.   Gastrointestinal: Negative for diarrhea, nausea and vomiting.   Musculoskeletal: Negative for myalgias. "   Neurological: Negative for headache.       Vitals:    03/17/22 1119   BP: 130/80   Pulse: 70   Resp: 16   Temp: 97.3 °F (36.3 °C)   SpO2: 95%     Body mass index is 33.89 kg/m².      Current Outpatient Medications:   •  amLODIPine (NORVASC) 5 MG tablet, Take 5 mg by mouth Daily., Disp: , Rfl:   •  aspirin 81 MG EC tablet, Take 81 mg by mouth Daily., Disp: , Rfl:   •  esomeprazole (nexIUM) 40 MG capsule, Take 40 mg by mouth Every Morning Before Breakfast., Disp: , Rfl:   •  losartan (COZAAR) 100 MG tablet, TAKE 1 TABLET BY MOUTH DAILY, Disp: 90 tablet, Rfl: 1  •  Ozempic, 0.25 or 0.5 MG/DOSE, 2 MG/1.5ML solution pen-injector, , Disp: , Rfl:   •  rosuvastatin (CRESTOR) 20 MG tablet, Take 1 tablet by mouth Daily., Disp: 30 tablet, Rfl: 0  •  tadalafil (CIALIS) 5 MG tablet, Take 1 tab by mouth daily.  May take an additional 3 tabs by mouth as desired for erectile dysfunction., Disp: , Rfl:   •  albuterol sulfate  (90 Base) MCG/ACT inhaler, Inhale 2 puffs Every 4 (Four) Hours As Needed for Wheezing., Disp: 18 g, Rfl: 0  •  azithromycin (Zithromax Z-Ayaz) 250 MG tablet, Take 2 tablets by mouth on day 1, then 1 tablet daily on days 2-5, Disp: 6 tablet, Rfl: 0  •  fluticasone (FLONASE) 50 MCG/ACT nasal spray, 1 spray into the nostril(s) as directed by provider Daily., Disp: 1 bottle, Rfl: 0  •  guaiFENesin (Mucinex) 600 MG 12 hr tablet, Take 2 tablets by mouth 2 (Two) Times a Day., Disp: 30 tablet, Rfl: 0  •  methylPREDNISolone (MEDROL) 4 MG dose pack, Take as directed on package instructions., Disp: 21 tablet, Rfl: 0  •  promethazine-dextromethorphan (PROMETHAZINE-DM) 6.25-15 MG/5ML syrup, Take 5 mL by mouth 4 (Four) Times a Day As Needed for Cough., Disp: 240 mL, Rfl: 0  •  rifaximin (XIFAXAN) 550 MG tablet, Take 1 tablet by mouth Every 8 (Eight) Hours., Disp: 42 tablet, Rfl: 0  •  sildenafil (Viagra) 100 MG tablet, Take 1 tablet by mouth Daily As Needed for Erectile Dysfunction., Disp: 30 tablet, Rfl:  5    PE    Physical Exam  Vitals reviewed.   Constitutional:       General: He is not in acute distress.     Appearance: He is well-developed.   HENT:      Head: Normocephalic.      Right Ear: Tympanic membrane and ear canal normal. Tympanic membrane is not erythematous.      Left Ear: Tympanic membrane and ear canal normal. Tympanic membrane is not erythematous.      Nose:      Right Sinus: No maxillary sinus tenderness or frontal sinus tenderness.      Left Sinus: No maxillary sinus tenderness or frontal sinus tenderness.      Mouth/Throat:      Mouth: Mucous membranes are moist.      Pharynx: Oropharynx is clear. Uvula midline. No posterior oropharyngeal erythema.      Tonsils: No tonsillar exudate.   Eyes:      General:         Right eye: No discharge.         Left eye: No discharge.      Conjunctiva/sclera: Conjunctivae normal.   Cardiovascular:      Rate and Rhythm: Normal rate and regular rhythm.      Heart sounds: Normal heart sounds.   Pulmonary:      Effort: Pulmonary effort is normal. No respiratory distress.      Breath sounds: Wheezing and rhonchi present. No rales.   Chest:   Breasts:      Right: No supraclavicular adenopathy.      Left: No supraclavicular adenopathy.       Musculoskeletal:      Cervical back: Normal range of motion and neck supple.   Lymphadenopathy:      Cervical: No cervical adenopathy.      Upper Body:      Right upper body: No supraclavicular adenopathy.      Left upper body: No supraclavicular adenopathy.   Skin:     General: Skin is warm and dry.   Psychiatric:         Behavior: Behavior normal.          A/P    Problem List Items Addressed This Visit    None     Visit Diagnoses     Acute bronchitis, unspecified organism    -  Primary  -Discussed likely viral etiology and initial tx with medrol dose pack, mucinex for congestion, Rx cough syrup, and albuterol inhaler to use prn for wheezing,   -Rx azithromycin to start if symptoms worsen/persist for more than 1 week    Relevant  Medications    albuterol sulfate  (90 Base) MCG/ACT inhaler    guaiFENesin (Mucinex) 600 MG 12 hr tablet    promethazine-dextromethorphan (PROMETHAZINE-DM) 6.25-15 MG/5ML syrup    Other Relevant Orders    COVID-19 PCR, [facility], NP SWAB IN [facility]/ORAL SWISH 24-30 HR TAT - Swab, Nasopharynx (Completed)          Plan of care was reviewed with patient at the conclusion of today's visit. Education was provided regarding diagnoses, management, prescribed or recommended OTC products, and the importance of compliance with follow-up appointments. The patient was counseled regarding the risks, benefits, and possible side-effects of treatment. I advised the patient to keep me informed of any acute changes in their status including new, worsening, or persistent symptoms. Patient expresses understanding and agreement with the management plan.        SAMUEL Wilson

## 2022-03-18 LAB — SARS-COV-2 RNA NOSE QL NAA+PROBE: NOT DETECTED

## 2022-05-16 ENCOUNTER — OFFICE VISIT (OUTPATIENT)
Dept: FAMILY MEDICINE CLINIC | Facility: CLINIC | Age: 77
End: 2022-05-16

## 2022-05-16 VITALS
HEART RATE: 65 BPM | WEIGHT: 239 LBS | RESPIRATION RATE: 14 BRPM | OXYGEN SATURATION: 95 % | HEIGHT: 71 IN | SYSTOLIC BLOOD PRESSURE: 122 MMHG | DIASTOLIC BLOOD PRESSURE: 78 MMHG | TEMPERATURE: 97.5 F | BODY MASS INDEX: 33.46 KG/M2

## 2022-05-16 DIAGNOSIS — R05.9 COUGH: ICD-10-CM

## 2022-05-16 DIAGNOSIS — R53.83 FATIGUE, UNSPECIFIED TYPE: ICD-10-CM

## 2022-05-16 DIAGNOSIS — U07.1 COVID-19 VIRUS INFECTION: Primary | ICD-10-CM

## 2022-05-16 PROCEDURE — 99213 OFFICE O/P EST LOW 20 MIN: CPT | Performed by: PHYSICIAN ASSISTANT

## 2022-05-16 RX ORDER — DEXTROMETHORPHAN HYDROBROMIDE AND PROMETHAZINE HYDROCHLORIDE 15; 6.25 MG/5ML; MG/5ML
5 SYRUP ORAL 4 TIMES DAILY PRN
Qty: 240 ML | Refills: 0 | Status: SHIPPED | OUTPATIENT
Start: 2022-05-16 | End: 2022-09-26

## 2022-05-16 RX ORDER — GUAIFENESIN 600 MG/1
600 TABLET, EXTENDED RELEASE ORAL 2 TIMES DAILY
Qty: 30 TABLET | Refills: 0 | Status: SHIPPED | OUTPATIENT
Start: 2022-05-16 | End: 2022-09-26

## 2022-05-16 RX ORDER — SEMAGLUTIDE 1.34 MG/ML
0.5 INJECTION, SOLUTION SUBCUTANEOUS WEEKLY
Qty: 3 PEN | Refills: 1 | Status: SHIPPED | OUTPATIENT
Start: 2022-05-16 | End: 2022-09-13 | Stop reason: SDUPTHER

## 2022-05-16 NOTE — PROGRESS NOTES
"    Chief Complaint   Patient presents with   • Cough     Covid positive 12 days ago.    • Fatigue       HPI     Shaka Gurrola is a pleasant 76 y.o. male who presents for evaluation of \"chief complaint.\"   Patient c/o new onset cough 12 days ago. He has tested positive for COVID x 2 after he was around his grandson who had a fever. He improving but has ongoing fatigue and productive cough but does not pay attention to the sputum color. Denies fever, wheezing, shortness of breath, chest pain, rhinorrhea, sinus pain, and postnasal drip. He has not tried OTC cough medications thus far. He had a physical with his provider at the [facility] in January.     Past Medical History:   Diagnosis Date   • Atrophic testicle     Left   • BPH with elevated PSA    • Hiatal hernia with gastroesophageal reflux    • Hx of iron deficiency anemia    • Inguinal hernia     Right       Past Surgical History:   Procedure Laterality Date   • ANKLE SURGERY Left     Plate   • CORONARY ANGIOPLASTY WITH STENT PLACEMENT     • KNEE ARTHROSCOPY Right    • PROSTATE BIOPSY     • TOTAL HIP ARTHROPLASTY Right 01/14/2019    Dr. Fernandez   • TOTAL HIP ARTHROPLASTY REVISION Left        Family History   Problem Relation Age of Onset   • Aneurysm Mother    • Lung cancer Father        Social History     Socioeconomic History   • Marital status:    Tobacco Use   • Smoking status: Never Smoker   • Smokeless tobacco: Never Used   Vaping Use   • Vaping Use: Never used   Substance and Sexual Activity   • Alcohol use: Yes   • Drug use: No   • Sexual activity: Defer       Allergies   Allergen Reactions   • Amlodipine Other (See Comments)     Leg swelling, shortness of breath       ROS    Review of Systems   Constitutional: Negative for chills and fever.   HENT: Positive for congestion. Negative for postnasal drip, rhinorrhea and sore throat.    Respiratory: Positive for cough. Negative for shortness of breath and wheezing.    Cardiovascular: Negative for " chest pain.   Gastrointestinal: Negative for diarrhea, nausea and vomiting.   Musculoskeletal: Negative for myalgias.   Neurological: Negative for headache.       Vitals:    05/16/22 1541   BP: 122/78   Pulse: 65   Resp: 14   Temp: 97.5 °F (36.4 °C)   SpO2: 95%     Body mass index is 33.33 kg/m².      Current Outpatient Medications:   •  amLODIPine (NORVASC) 5 MG tablet, Take 5 mg by mouth Daily., Disp: , Rfl:   •  aspirin 81 MG EC tablet, Take 81 mg by mouth Daily., Disp: , Rfl:   •  esomeprazole (nexIUM) 40 MG capsule, Take 40 mg by mouth Every Morning Before Breakfast., Disp: , Rfl:   •  guaiFENesin (Mucinex) 600 MG 12 hr tablet, Take 1 tablet by mouth 2 (Two) Times a Day., Disp: 30 tablet, Rfl: 0  •  losartan (COZAAR) 100 MG tablet, TAKE 1 TABLET BY MOUTH DAILY, Disp: 90 tablet, Rfl: 1  •  Ozempic, 0.25 or 0.5 MG/DOSE, 2 MG/1.5ML solution pen-injector, Inject 0.5 mg under the skin into the appropriate area as directed 1 (One) Time Per Week., Disp: 3 pen, Rfl: 1  •  promethazine-dextromethorphan (PROMETHAZINE-DM) 6.25-15 MG/5ML syrup, Take 5 mL by mouth 4 (Four) Times a Day As Needed for Cough., Disp: 240 mL, Rfl: 0  •  rifaximin (XIFAXAN) 550 MG tablet, Take 1 tablet by mouth Every 8 (Eight) Hours., Disp: 42 tablet, Rfl: 0  •  rosuvastatin (CRESTOR) 20 MG tablet, Take 1 tablet by mouth Daily. (Patient taking differently: Take 10 mg by mouth Daily.), Disp: 30 tablet, Rfl: 0  •  sildenafil (Viagra) 100 MG tablet, Take 1 tablet by mouth Daily As Needed for Erectile Dysfunction., Disp: 30 tablet, Rfl: 5  •  tadalafil (CIALIS) 5 MG tablet, Take 1 tab by mouth daily.  May take an additional 3 tabs by mouth as desired for erectile dysfunction., Disp: , Rfl:   •  albuterol sulfate  (90 Base) MCG/ACT inhaler, Inhale 2 puffs Every 4 (Four) Hours As Needed for Wheezing., Disp: 18 g, Rfl: 0  •  azithromycin (Zithromax Z-Ayaz) 250 MG tablet, Take 2 tablets by mouth on day 1, then 1 tablet daily on days 2-5, Disp: 6  tablet, Rfl: 0  •  fluticasone (FLONASE) 50 MCG/ACT nasal spray, 1 spray into the nostril(s) as directed by provider Daily., Disp: 1 bottle, Rfl: 0  •  methylPREDNISolone (MEDROL) 4 MG dose pack, Take as directed on package instructions., Disp: 21 tablet, Rfl: 0    PE    Physical Exam  Vitals reviewed.   Constitutional:       General: He is not in acute distress.     Appearance: He is well-developed.   HENT:      Head: Normocephalic.      Right Ear: Tympanic membrane and ear canal normal. Tympanic membrane is not erythematous.      Left Ear: Tympanic membrane and ear canal normal. Tympanic membrane is not erythematous.      Nose:      Right Sinus: No maxillary sinus tenderness or frontal sinus tenderness.      Left Sinus: No maxillary sinus tenderness or frontal sinus tenderness.      Mouth/Throat:      Mouth: Mucous membranes are moist.      Pharynx: Oropharynx is clear. Uvula midline. No posterior oropharyngeal erythema.      Tonsils: No tonsillar exudate.   Eyes:      General:         Right eye: No discharge.         Left eye: No discharge.      Conjunctiva/sclera: Conjunctivae normal.   Cardiovascular:      Rate and Rhythm: Normal rate and regular rhythm.      Heart sounds: Normal heart sounds.   Pulmonary:      Effort: Pulmonary effort is normal. No respiratory distress.      Breath sounds: Normal breath sounds. No wheezing, rhonchi or rales.   Chest:   Breasts:      Right: No supraclavicular adenopathy.      Left: No supraclavicular adenopathy.       Musculoskeletal:      Cervical back: Normal range of motion and neck supple.   Lymphadenopathy:      Cervical: No cervical adenopathy.      Upper Body:      Right upper body: No supraclavicular adenopathy.      Left upper body: No supraclavicular adenopathy.   Skin:     General: Skin is warm and dry.   Psychiatric:         Behavior: Behavior normal.          A/P    Problem List Items Addressed This Visit    None     Visit Diagnoses     COVID-19 virus infection     -  Primary  -Symptom onset 12 days ago, improving      Cough      -Lungs clear, afebrile, 02 saturation WNL  -Discussed likely postinfectious cough which can persist for up to 6 weeks  -Trial promethazine-DM cough syrup and mucinex prn  -RTC if symptoms worsen or cough lasts longer than 4-6 weeks       Fatigue, unspecified type      -Sequelae of COVID-19 infection  -Monitor          Plan of care was reviewed with patient at the conclusion of today's visit. Education was provided regarding diagnoses, management, prescribed or recommended OTC products, and the importance of compliance with follow-up appointments. The patient was counseled regarding the risks, benefits, and possible side-effects of treatment. I advised the patient to keep me informed of any acute changes in their status including new, worsening, or persistent symptoms. Patient expresses understanding and agreement with the management plan.        SAMUEL Wilson

## 2022-06-29 RX ORDER — LOSARTAN POTASSIUM 100 MG/1
100 TABLET ORAL DAILY
Qty: 90 TABLET | Refills: 1 | Status: SHIPPED | OUTPATIENT
Start: 2022-06-29 | End: 2022-09-26 | Stop reason: SDUPTHER

## 2022-09-13 NOTE — TELEPHONE ENCOUNTER
Rx Refill Note  Requested Prescriptions     Pending Prescriptions Disp Refills   • Ozempic, 0.25 or 0.5 MG/DOSE, 2 MG/1.5ML solution pen-injector       Sig: Inject 0.5 mg under the skin into the appropriate area as directed 1 (One) Time Per Week.      Last office visit with prescribing clinician: 5/16/2022      Next office visit with prescribing clinician: 9/26/2022            Fidelia Momin MA  09/13/22, 13:20 EDT

## 2022-09-13 NOTE — TELEPHONE ENCOUNTER
Caller: Shaka Gurrola    Relationship: Self    Best call back number: [phone]    Requested Prescriptions:   Requested Prescriptions     Pending Prescriptions Disp Refills   • Ozempic, 0.25 or 0.5 MG/DOSE, 2 MG/1.5ML solution pen-injector       Sig: Inject 0.5 mg under the skin into the appropriate area as directed 1 (One) Time Per Week.        Pharmacy where request should be sent: [id number] [address] - [phone]  - [phone]     Additional details provided by patient: NOT AVAILABLE IN THE 0.5 MG, PLEASE FILL AS 1 MG.  PATIENT COMPLETELY OUT  Does the patient have less than a 3 day supply:  [x] Yes  [] No    Ibis Chamberlain Rep   09/13/22 12:30 EDT

## 2022-09-14 RX ORDER — SEMAGLUTIDE 1.34 MG/ML
0.5 INJECTION, SOLUTION SUBCUTANEOUS WEEKLY
Qty: 4.5 ML | Refills: 1 | Status: SHIPPED | OUTPATIENT
Start: 2022-09-14 | End: 2022-09-26

## 2022-09-26 ENCOUNTER — OFFICE VISIT (OUTPATIENT)
Dept: FAMILY MEDICINE CLINIC | Facility: CLINIC | Age: 77
End: 2022-09-26

## 2022-09-26 VITALS
OXYGEN SATURATION: 96 % | BODY MASS INDEX: 33.46 KG/M2 | HEART RATE: 70 BPM | HEIGHT: 71 IN | SYSTOLIC BLOOD PRESSURE: 122 MMHG | WEIGHT: 239 LBS | DIASTOLIC BLOOD PRESSURE: 64 MMHG | TEMPERATURE: 97.1 F

## 2022-09-26 DIAGNOSIS — I25.10 CORONARY ARTERY DISEASE INVOLVING NATIVE CORONARY ARTERY OF NATIVE HEART WITHOUT ANGINA PECTORIS: ICD-10-CM

## 2022-09-26 DIAGNOSIS — E66.09 CLASS 1 OBESITY DUE TO EXCESS CALORIES WITH SERIOUS COMORBIDITY AND BODY MASS INDEX (BMI) OF 33.0 TO 33.9 IN ADULT: ICD-10-CM

## 2022-09-26 DIAGNOSIS — K58.0 IRRITABLE BOWEL SYNDROME WITH DIARRHEA: ICD-10-CM

## 2022-09-26 DIAGNOSIS — Z85.828 HISTORY OF BASAL CELL CARCINOMA (BCC): ICD-10-CM

## 2022-09-26 DIAGNOSIS — R97.20 ELEVATED PSA: ICD-10-CM

## 2022-09-26 DIAGNOSIS — Z00.00 MEDICARE ANNUAL WELLNESS VISIT, SUBSEQUENT: Primary | ICD-10-CM

## 2022-09-26 DIAGNOSIS — L57.8 ACTINIC SKIN DAMAGE: ICD-10-CM

## 2022-09-26 DIAGNOSIS — N52.9 ERECTILE DYSFUNCTION, UNSPECIFIED ERECTILE DYSFUNCTION TYPE: ICD-10-CM

## 2022-09-26 DIAGNOSIS — Z86.010 HISTORY OF COLON POLYPS: ICD-10-CM

## 2022-09-26 PROBLEM — E66.811 CLASS 1 OBESITY DUE TO EXCESS CALORIES WITH SERIOUS COMORBIDITY AND BODY MASS INDEX (BMI) OF 33.0 TO 33.9 IN ADULT: Status: ACTIVE | Noted: 2021-09-30

## 2022-09-26 PROCEDURE — 1126F AMNT PAIN NOTED NONE PRSNT: CPT | Performed by: PHYSICIAN ASSISTANT

## 2022-09-26 PROCEDURE — 1170F FXNL STATUS ASSESSED: CPT | Performed by: PHYSICIAN ASSISTANT

## 2022-09-26 PROCEDURE — 1159F MED LIST DOCD IN RCRD: CPT | Performed by: PHYSICIAN ASSISTANT

## 2022-09-26 PROCEDURE — G0439 PPPS, SUBSEQ VISIT: HCPCS | Performed by: PHYSICIAN ASSISTANT

## 2022-09-26 RX ORDER — LOSARTAN POTASSIUM 100 MG/1
100 TABLET ORAL DAILY
Qty: 90 TABLET | Refills: 3 | Status: SHIPPED | OUTPATIENT
Start: 2022-09-26

## 2022-09-26 RX ORDER — SEMAGLUTIDE 1.34 MG/ML
1 INJECTION, SOLUTION SUBCUTANEOUS
Qty: 3 ML | Refills: 5 | Status: SHIPPED | OUTPATIENT
Start: 2022-09-26 | End: 2023-01-27 | Stop reason: SDUPTHER

## 2022-09-26 RX ORDER — ROSUVASTATIN CALCIUM 5 MG/1
5 TABLET, COATED ORAL DAILY
Qty: 90 TABLET | Refills: 1 | Status: SHIPPED | OUTPATIENT
Start: 2022-09-26 | End: 2023-03-28

## 2022-09-26 NOTE — PROGRESS NOTES
QUICK REFERENCE INFORMATION:  The ABCs of the Annual Wellness Visit    Medicare Subsequent Wellness Visit    Chief Complaint   Patient presents with   • Medicare Wellness-subsequent   • Hyperlipidemia   • Hypertension        HPI     hSaka Gurrola is a 77 y.o. male presenting for subsequent annual wellness visit.   Patient states he feels well overall. He recently returned from [place] for his [employer]th [employer] reunion.     He did see internal medicine, urology, sleep medicine, and cardiology at the [facility] in [place], [place] in January this year. He plans to have biennial evaluations there.   He walks 1.5 miles on 4-5 days per week and has no exertional limitation.   Did not try lower dosage of crestor. He had myalgias on 20 mg daily.  He recently increased to 1 mg of Ozempic 2 weeks ago. He is tolerating this well. He denies GI side-effects. He has lost nearly 20 pounds since January.   Cialis daily has improved urinary incontinence.   He does not take amlodipine but is compliant with losartan. He does not check home blood pressures.   He mentions face itching at times. He does follow with dermatology.     Past Medical History:   Diagnosis Date   • Atrophic testicle     Left   • BPH with elevated PSA    • Hiatal hernia with gastroesophageal reflux    • Hx of iron deficiency anemia    • Inguinal hernia     Right      Past Surgical History:   Procedure Laterality Date   • ANKLE SURGERY Left     Plate   • CORONARY ANGIOPLASTY WITH STENT PLACEMENT     • KNEE ARTHROSCOPY Right    • PROSTATE BIOPSY     • TOTAL HIP ARTHROPLASTY Right 01/14/2019    Dr. Fernandez   • TOTAL HIP ARTHROPLASTY REVISION Left      Family History   Problem Relation Age of Onset   • Aneurysm Mother    • Lung cancer Father       Social History     Socioeconomic History   • Marital status:    Tobacco Use   • Smoking status: Never Smoker   • Smokeless tobacco: Never Used   Vaping Use   • Vaping Use: Never used   Substance and Sexual  Activity   • Alcohol use: Yes   • Drug use: No   • Sexual activity: Defer      Allergies   Allergen Reactions   • Amlodipine Other (See Comments)     Leg swelling, shortness of breath      Outpatient Medications Prior to Visit   Medication Sig Dispense Refill   • aspirin 81 MG EC tablet Take 81 mg by mouth Daily.     • esomeprazole (nexIUM) 40 MG capsule Take 40 mg by mouth Every Morning Before Breakfast.     • tadalafil (CIALIS) 5 MG tablet Take 1 tab by mouth daily.  May take an additional 3 tabs by mouth as desired for erectile dysfunction.     • amLODIPine (NORVASC) 5 MG tablet Take 5 mg by mouth Daily.     • losartan (COZAAR) 100 MG tablet TAKE 1 TABLET BY MOUTH DAILY 90 tablet 1   • Ozempic, 0.25 or 0.5 MG/DOSE, 2 MG/1.5ML solution pen-injector Inject 0.5 mg under the skin into the appropriate area as directed 1 (One) Time Per Week. 4.5 mL 1   • sildenafil (Viagra) 100 MG tablet Take 1 tablet by mouth Daily As Needed for Erectile Dysfunction. 30 tablet 5   • albuterol sulfate  (90 Base) MCG/ACT inhaler Inhale 2 puffs Every 4 (Four) Hours As Needed for Wheezing. 18 g 0   • azithromycin (Zithromax Z-Ayaz) 250 MG tablet Take 2 tablets by mouth on day 1, then 1 tablet daily on days 2-5 6 tablet 0   • fluticasone (FLONASE) 50 MCG/ACT nasal spray 1 spray into the nostril(s) as directed by provider Daily. 1 bottle 0   • guaiFENesin (Mucinex) 600 MG 12 hr tablet Take 1 tablet by mouth 2 (Two) Times a Day. 30 tablet 0   • methylPREDNISolone (MEDROL) 4 MG dose pack Take as directed on package instructions. 21 tablet 0   • promethazine-dextromethorphan (PROMETHAZINE-DM) 6.25-15 MG/5ML syrup Take 5 mL by mouth 4 (Four) Times a Day As Needed for Cough. 240 mL 0   • rifaximin (XIFAXAN) 550 MG tablet Take 1 tablet by mouth Every 8 (Eight) Hours. 42 tablet 0   • rosuvastatin (CRESTOR) 20 MG tablet Take 1 tablet by mouth Daily. (Patient taking differently: Take 10 mg by mouth Daily.) 30 tablet 0     No  "facility-administered medications prior to visit.       Reviewed use of high risk medication in the elderly: yes  Reviewed for potential of harmful drug interactions in the elderly: yes    The following portions of the patient's history were reviewed and updated as appropriate: allergies, current medications, past family history, past medical history, past social history, past surgical history and problem list.    Review of Systems   Constitutional: Negative for appetite change, diaphoresis, fatigue, fever and unexpected weight loss.   HENT: Negative for congestion, hearing loss, sore throat, swollen glands and trouble swallowing.    Eyes: Negative for blurred vision and visual disturbance.   Respiratory: Negative for cough and shortness of breath.    Cardiovascular: Negative for chest pain.   Gastrointestinal: Negative for abdominal pain, blood in stool, constipation, diarrhea and GERD.   Endocrine: Negative for cold intolerance, heat intolerance and polydipsia.   Genitourinary: Positive for erectile dysfunction and scrotal swelling. Negative for dysuria, hematuria and testicular pain.   Musculoskeletal: Negative for arthralgias and myalgias.   Skin: Negative for dry skin and rash.   Neurological: Negative for dizziness, numbness and headache.   Hematological: Negative for adenopathy.   Psychiatric/Behavioral: Positive for sleep disturbance. Negative for depressed mood. The patient is not nervous/anxious.         Vitals:    09/26/22 1523 09/26/22 1605   BP: 130/86 122/64   BP Location:  Left arm   Patient Position:  Sitting   Cuff Size:  Large Adult   Pulse: 70    Temp: 97.1 °F (36.2 °C)    SpO2: 96%    Weight: 108 kg (239 lb)    Height: 180.3 cm (71\")    PainSc: 0-No pain      Body mass index is 33.33 kg/m².    Objective    Physical Exam  Vitals reviewed.   Constitutional:       General: He is not in acute distress.     Appearance: He is well-developed. He is obese.   HENT:      Head: Normocephalic and atraumatic. "      Right Ear: Hearing and tympanic membrane normal.      Left Ear: Hearing and tympanic membrane normal.      Mouth/Throat:      Mouth: Mucous membranes are moist.      Dentition: Normal dentition.      Pharynx: Oropharynx is clear.   Eyes:      Extraocular Movements: Extraocular movements intact.      Conjunctiva/sclera: Conjunctivae normal.      Pupils: Pupils are equal, round, and reactive to light.      Comments:      Neck:      Thyroid: No thyroid mass or thyromegaly.      Vascular: No carotid bruit.   Cardiovascular:      Rate and Rhythm: Normal rate and regular rhythm.      Heart sounds: No murmur heard.    No friction rub.   Pulmonary:      Effort: Pulmonary effort is normal.      Breath sounds: Normal breath sounds.   Chest:   Breasts:      Right: No supraclavicular adenopathy.      Left: No supraclavicular adenopathy.       Abdominal:      General: Bowel sounds are normal. There is no abdominal bruit.      Palpations: Abdomen is soft. There is no mass.      Tenderness: There is no abdominal tenderness.   Musculoskeletal:         General: Normal range of motion.      Cervical back: Normal range of motion and neck supple.   Lymphadenopathy:      Cervical: No cervical adenopathy.      Upper Body:      Right upper body: No supraclavicular adenopathy.      Left upper body: No supraclavicular adenopathy.   Skin:     General: Skin is warm and dry.      Findings: No rash.      Nails: There is no clubbing.      Comments: Diffuse actinic skin damage to face noted. No suspicious nevi   Neurological:      Mental Status: He is alert.      Gait: Gait normal.      Deep Tendon Reflexes: Reflexes normal.   Psychiatric:         Speech: Speech normal.         Behavior: Behavior normal.          HEALTH RISK ASSESSMENT    1945    Recent Hospitalizations:  No hospitalization(s) within the last year..      Current Medical Providers:  Patient Care Team:  Carlos Enrique Jamison PA as PCP - General (Physician Assistant)  Lisa  Pedro AMARO MD as Consulting Physician (Dermatology)      Smoking Status:  Social History     Tobacco Use   Smoking Status Never Smoker   Smokeless Tobacco Never Used       Alcohol Consumption:  Social History     Substance and Sexual Activity   Alcohol Use Yes       Depression Screen:   PHQ-2/PHQ-9 Depression Screening 9/26/2022   Retired PHQ-9 Total Score -   Retired Total Score -   Little Interest or Pleasure in Doing Things 0-->not at all   Feeling Down, Depressed or Hopeless 0-->not at all   PHQ-9: Brief Depression Severity Measure Score 0       Health Habits and Functional and Cognitive Screening:  Functional & Cognitive Status 9/26/2022   Do you have difficulty preparing food and eating? No   Do you have difficulty bathing yourself, getting dressed or grooming yourself? No   Do you have difficulty using the toilet? No   Do you have difficulty moving around from place to place? No   Do you have trouble with steps or getting out of a bed or a chair? No   Current Diet Well Balanced Diet   Dental Exam Up to date   Eye Exam Up to date   Exercise (times per week) 5 times per week   Current Exercises Include Walking   Do you need help using the phone?  No   Are you deaf or do you have serious difficulty hearing?  No   Do you need help with transportation? No   Do you need help shopping? No   Do you need help preparing meals?  No   Do you need help with housework?  No   Do you need help with laundry? No   Do you need help taking your medications? No   Do you need help managing money? No   Do you ever drive or ride in a car without wearing a seat belt? No   Have you felt unusual stress, anger or loneliness in the last month? No   Who do you live with? Spouse   If you need help, do you have trouble finding someone available to you? No   Have you been bothered in the last four weeks by sexual problems? No   Do you have difficulty concentrating, remembering or making decisions? No           Does the patient have evidence of  cognitive impairment? No    Aspirin use counseling? Start ASA 81 mg daily       Recent Lab Results:  CMP:  Lab Results   Component Value Date    BUN 20 10/01/2021    CREATININE 1.03 01/18/2022    EGFRIFNONA 71 01/18/2022    EGFRIFAFRI 82 01/18/2022    BCR 22.0 10/01/2021     01/18/2022    K 4.6 01/18/2022    CO2 23.9 10/01/2021    CALCIUM 9.2 01/18/2022    ALBUMIN 4.40 10/01/2021    LABIL2 0.95 02/21/2020    BILITOT 0.7 10/01/2021    ALKPHOS 76 01/18/2022    AST 18 10/01/2021    ALT 14 01/18/2022     Lipid Panel:  Lab Results   Component Value Date    CHOL 133 10/01/2021    TRIG 57 10/01/2021    HDL 68 (H) 10/01/2021    VLDL 12 10/01/2021    LDLHDL 0.79 10/01/2021     HbA1c:       Visual Acuity:  No exam data present    Age-appropriate Screening Schedule:  Refer to the list below for future screening recommendations based on patient's age, sex and/or medical conditions. Orders for these recommended tests are listed in the plan section. The patient has been provided with a written plan.    Health Maintenance   Topic Date Due   • INFLUENZA VACCINE  10/01/2022   • LIPID PANEL  01/18/2023   • TDAP/TD VACCINES (3 - Td or Tdap) 10/04/2027   • ZOSTER VACCINE  Completed        Advance Care Planning:  ACP discussion was held with the patient during this visit. Patient has an advance directive (not in EMR), copy requested.    Identification of Risk Factors:  Risk factors include: Advance Directive Discussion  Cardiovascular risk  Colon Cancer Screening  Immunizations Discussed/Encouraged (specific immunizations; Influenza )  Obesity/Overweight .    Compared to one year ago, the patient feels his physical health is the same.  Compared to one year ago, the patient feels his mental health is the same.      Diagnoses and all orders for this visit:    1. Medicare annual wellness visit, subsequent (Primary)    2. Coronary artery disease involving native coronary artery of native heart without angina pectoris  -      Comprehensive Metabolic Panel; Future  -     Lipid Panel; Future    3. Class 1 obesity due to excess calories with serious comorbidity and body mass index (BMI) of 33.0 to 33.9 in adult    4. History of colon polyps    5. Irritable bowel syndrome with diarrhea    6. Elevated PSA    7. Erectile dysfunction, unspecified erectile dysfunction type    8. History of basal cell carcinoma (BCC)    9. Actinic skin damage    Other orders  -     rosuvastatin (CRESTOR) 5 MG tablet; Take 1 tablet by mouth Daily.  Dispense: 90 tablet; Refill: 1  -     Semaglutide, 1 MG/DOSE, (Ozempic, 1 MG/DOSE,) 4 MG/3ML solution pen-injector; Inject 1 mg under the skin into the appropriate area as directed Every 7 (Seven) Days.  Dispense: 3 mL; Refill: 5  -     losartan (COZAAR) 100 MG tablet; Take 1 tablet by mouth Daily.  Dispense: 90 tablet; Refill: 3      Counseled patient regarding cancer screening, immunizations, healthy lifestyle, diet, maintaining a healthy weight, and exercise. Annual dental and eye exams were encouraged. BMI is >= 30 and <35. (Class 1 Obesity). The following options were offered after discussion;: exercise counseling/recommendations and nutrition counseling/recommendations. Continue Ozempic 1 mg weekly We discussed possible titrating his dose for more appetite suppression and weight loss but he would like to continue his current dose at this time. Recommended receiving the influenza vaccine in late October which should offer better protection for the spring.  He just recently received a COVID-vaccine booster. We discussed waiting at least 2 months before receiving the new bivalent booster. Last colonoscopy of record was in December. No polyps were removed at that time. This is most likely a 10-year follow-up but we will clarify this with gastroenterology.  He is agreeable to a trial of low-dose rosuvastatin 5 mg daily.  If he has myalgias, I recommended reducing it to every other day. If he is still having side  effects, he could reduce to 5 mg twice weekly. Advised patient to return to our lab for fasting blood work in 2 to 3 months to recheck a lipid profile and CMP. Encouraged dermatology follow-up for facial actinic damage and itching. I will see him back in about 6 months for follow-up of chronic conditions.    Procedure   Procedures       Patient Self-Management and Personalized Health Advice  The patient has been provided with information about: diet, exercise and weight management and preventive services including:   · Annual Wellness Visit (AWV).      Follow Up:  Return in about 6 months (around 3/26/2023), or if symptoms worsen or fail to improve, for Follow-up chronic conditions.     An After Visit Summary and PPPS with all of these plans were given to the patient.

## 2022-12-21 ENCOUNTER — OFFICE VISIT (OUTPATIENT)
Dept: FAMILY MEDICINE CLINIC | Facility: CLINIC | Age: 77
End: 2022-12-21

## 2022-12-21 ENCOUNTER — HOSPITAL ENCOUNTER (OUTPATIENT)
Dept: GENERAL RADIOLOGY | Facility: HOSPITAL | Age: 77
Discharge: HOME | End: 2022-12-21

## 2022-12-21 VITALS
OXYGEN SATURATION: 98 % | TEMPERATURE: 97.1 F | HEART RATE: 65 BPM | HEIGHT: 71 IN | DIASTOLIC BLOOD PRESSURE: 82 MMHG | BODY MASS INDEX: 33.6 KG/M2 | SYSTOLIC BLOOD PRESSURE: 122 MMHG | WEIGHT: 240 LBS

## 2022-12-21 DIAGNOSIS — M54.50 ACUTE MIDLINE LOW BACK PAIN WITHOUT SCIATICA: ICD-10-CM

## 2022-12-21 DIAGNOSIS — M54.50 ACUTE MIDLINE LOW BACK PAIN WITHOUT SCIATICA: Primary | ICD-10-CM

## 2022-12-21 PROCEDURE — 72100 X-RAY EXAM L-S SPINE 2/3 VWS: CPT

## 2022-12-21 PROCEDURE — 99213 OFFICE O/P EST LOW 20 MIN: CPT | Performed by: PHYSICIAN ASSISTANT

## 2022-12-21 RX ORDER — METHOCARBAMOL 500 MG/1
500 TABLET, FILM COATED ORAL 3 TIMES DAILY PRN
Qty: 30 TABLET | Refills: 0 | Status: SHIPPED | OUTPATIENT
Start: 2022-12-21 | End: 2022-12-31

## 2022-12-21 RX ORDER — CELECOXIB 200 MG/1
200 CAPSULE ORAL DAILY
Qty: 21 CAPSULE | Refills: 0 | Status: SHIPPED | OUTPATIENT
Start: 2022-12-21 | End: 2023-01-16

## 2022-12-21 NOTE — PROGRESS NOTES
"    Chief Complaint   Patient presents with   • muscle pain in back        HPI     Shaka Gurrola is a pleasant 77 y.o. male who presents for evaluation of \"chief complaint.\"     Patient c/o 3-4 week history of low back pain and stiffness after inactivity. Started after driving for 2 hours to [place], but he denies an injury or prior back problems. He takes Aleve 2 daily but this does not help his back. Denies fever, saddle anesthesia, fever, radiculopathy, bladder or bowel incontinence, urinary retention, and lower extremity numbness and weakness.    Past Medical History:   Diagnosis Date   • Atrophic testicle     Left   • BPH with elevated PSA    • Hiatal hernia with gastroesophageal reflux    • Hx of iron deficiency anemia    • Inguinal hernia     Right       Past Surgical History:   Procedure Laterality Date   • ANKLE SURGERY Left     Plate   • CORONARY ANGIOPLASTY WITH STENT PLACEMENT     • KNEE ARTHROSCOPY Right    • PROSTATE BIOPSY     • TOTAL HIP ARTHROPLASTY Right 01/14/2019    Dr. Fernandez   • TOTAL HIP ARTHROPLASTY REVISION Left        Family History   Problem Relation Age of Onset   • Aneurysm Mother    • Lung cancer Father        Social History     Socioeconomic History   • Marital status:    Tobacco Use   • Smoking status: Never   • Smokeless tobacco: Never   Vaping Use   • Vaping Use: Never used   Substance and Sexual Activity   • Alcohol use: Yes     Alcohol/week: 17.0 standard drinks     Types: 7 Glasses of wine, 10 Shots of liquor per week   • Drug use: No   • Sexual activity: Yes     Partners: Female       Allergies   Allergen Reactions   • Amlodipine Other (See Comments)     Leg swelling, shortness of breath       ROS    Review of Systems   Constitutional: Negative for fever and unexpected weight loss.   Cardiovascular: Negative for chest pain.   Gastrointestinal: Negative for abdominal pain.   Genitourinary: Negative for dysuria and urinary incontinence.   Musculoskeletal: Positive for " back pain.   Neurological: Negative for weakness and numbness.       Vitals:    12/21/22 0944   BP: 122/82   Pulse: 65   Temp: 97.1 °F (36.2 °C)   SpO2: 98%     Body mass index is 33.47 kg/m².      Current Outpatient Medications:   •  aspirin 81 MG EC tablet, Take 81 mg by mouth Daily., Disp: , Rfl:   •  esomeprazole (nexIUM) 40 MG capsule, Take 40 mg by mouth Every Morning Before Breakfast., Disp: , Rfl:   •  losartan (COZAAR) 100 MG tablet, Take 1 tablet by mouth Daily., Disp: 90 tablet, Rfl: 3  •  rosuvastatin (CRESTOR) 5 MG tablet, Take 1 tablet by mouth Daily., Disp: 90 tablet, Rfl: 1  •  Semaglutide, 1 MG/DOSE, (Ozempic, 1 MG/DOSE,) 4 MG/3ML solution pen-injector, Inject 1 mg under the skin into the appropriate area as directed Every 7 (Seven) Days., Disp: 3 mL, Rfl: 5  •  sildenafil (Viagra) 100 MG tablet, Take 1 tablet by mouth Daily As Needed for Erectile Dysfunction., Disp: 30 tablet, Rfl: 5  •  tadalafil (CIALIS) 5 MG tablet, Take 1 tab by mouth daily.  May take an additional 3 tabs by mouth as desired for erectile dysfunction., Disp: , Rfl:   •  celecoxib (CeleBREX) 200 MG capsule, Take 1 capsule by mouth Daily for 21 days., Disp: 21 capsule, Rfl: 0  •  methocarbamol (Robaxin) 500 MG tablet, Take 1 tablet by mouth 3 (Three) Times a Day As Needed (moderate) for up to 10 days., Disp: 30 tablet, Rfl: 0    PE    Physical Exam  Vitals reviewed.   Constitutional:       General: He is not in acute distress.     Appearance: He is well-developed.   Cardiovascular:      Rate and Rhythm: Normal rate and regular rhythm.   Pulmonary:      Effort: Pulmonary effort is normal.      Breath sounds: Normal breath sounds.   Musculoskeletal:      Lumbar back: No tenderness or bony tenderness.        Back:       Right hip: Normal. No tenderness. Normal range of motion.      Left hip: Normal. No tenderness. Normal range of motion.      Comments: The patient indicates diffuse midline lumbar spine area of pain. There is no bony  or soft tissue tenderness to palpation.   Skin:     General: Skin is warm and dry.   Neurological:      Mental Status: He is alert.      Gait: Gait normal.      Deep Tendon Reflexes:      Reflex Scores:       Patellar reflexes are 2+ on the right side and 2+ on the left side.       Achilles reflexes are 2+ on the right side and 2+ on the left side.     Comments: BLE strength 5/5. Bilateral SLR negative.    Psychiatric:         Behavior: Behavior normal.          A/P    Problem List Items Addressed This Visit    None  Visit Diagnoses     Acute midline low back pain without sciatica    -  Primary  -Discussed differential diagnosis of lumbar strain, osteoarthritis, lumbar DDD, among others.   -Check lumbar spine x-rays and start initial treatment with Robaxin and Celebrex.  The patient would also like an order to physical therapy. He was advised to stop taking Aleve over-the-counter when he starts the Celebrex.  -Counseled the patient to return if symptoms worsen or persist.    Relevant Orders    XR Spine Lumbar 2 or 3 View          Plan of care was reviewed with patient at the conclusion of today's visit. Education was provided regarding diagnoses, management, prescribed or recommended OTC products, and the importance of compliance with follow-up appointments. The patient was counseled regarding the risks, benefits, and possible side-effects of treatment. I advised the patient to keep me informed of any acute changes in their status including new, worsening, or persistent symptoms. Patient expresses understanding and agreement with the management plan.        SAMUEL Wilson  Answers for HPI/ROS submitted by the patient on 12/20/2022  What is the primary reason for your visit?: Back Pain  Chronicity: new  Onset: 1 to 4 weeks ago  Frequency: 2 to 4 times per day  Progression since onset: gradually worsening  Pain location: lumbar spine  Pain quality: aching  Radiates to: does not radiate  Pain - numeric: 7/10  Pain  is: worse during the day  Aggravated by: bending, position, sitting, standing, twisting  Stiffness is present: all day  bowel incontinence: No  headaches: No  leg pain: No  paresis: No  paresthesias: No  pelvic pain: No  perianal numbness: No  tingling: No  Risk factors: obesity

## 2023-01-04 ENCOUNTER — TREATMENT (OUTPATIENT)
Dept: PHYSICAL THERAPY | Facility: CLINIC | Age: 78
End: 2023-01-04
Payer: MEDICARE

## 2023-01-04 DIAGNOSIS — M54.50 ACUTE BILATERAL LOW BACK PAIN WITHOUT SCIATICA: Primary | ICD-10-CM

## 2023-01-04 DIAGNOSIS — M79.10 MUSCLE TENSION PAIN: ICD-10-CM

## 2023-01-04 PROCEDURE — 97140 MANUAL THERAPY 1/> REGIONS: CPT | Performed by: PHYSICAL THERAPIST

## 2023-01-04 PROCEDURE — 97161 PT EVAL LOW COMPLEX 20 MIN: CPT | Performed by: PHYSICAL THERAPIST

## 2023-01-04 PROCEDURE — 97110 THERAPEUTIC EXERCISES: CPT | Performed by: PHYSICAL THERAPIST

## 2023-01-04 NOTE — PROGRESS NOTES
Physical Therapy Initial Evaluation and Plan of Care  [facility] [place] [facility] [place]  [address], [address] [address]    Patient: Shaka Gurrola   : 1945  Diagnosis/ICD-10 Code:  No primary diagnosis found.  Referring practitioner: SAMUEL Wilson  Date of Initial Visit: 2023  Today's Date: 2023  Patient seen for 1 session         Visit Diagnoses:  No diagnosis found.      Subjective Questionnaire: Oswestry:       Subjective Evaluation    History of Present Illness  Mechanism of injury: The pt reported a 2 month history of low back pain that began with driving to [place]. He noticed first onset of symptoms after standing from his car and has had difficulty with prolonged positioning since that time. Pain is on either side of the lumbar spine and is worsened with prolonged sitting and standing and is improved with change in position. He feels the symptoms are muscular. Symptoms have been persistent since onset and have not improved. He has not attempted any ice, heat, medication, or exercise.      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    Treatments  No previous or current treatments  Patient Goals  Patient goals for therapy: decreased pain and increased motion             Objective          Palpation   Left   Hypertonic in the erector spinae and quadratus lumborum.   Tenderness of the erector spinae and quadratus lumborum.   Trigger point to erector spinae and quadratus lumborum.     Right   Hypertonic in the erector spinae and quadratus lumborum. Tenderness of the erector spinae and quadratus  lumborum.   Trigger point to erector spinae and quadratus lumborum.     Tenderness     Additional Tenderness Details  Mild TTP in the lumbar SPs and B facets but reports of decreased pain with repeated PA assessment and subsequent mobilizations    Active Range of Motion     Additional Active Range of Motion Details  Lumbar flexion: 22 cm to the floor   Lumbar extension: 50%  R Lateral flexion: 6 cm to KJL  L Lateral flexion: 8 cm to KJL  R Rotation: 35 deg  L Rotation: 25 deg    No pain with motion testing    Strength/Myotome Testing     Left Hip   Planes of Motion   Flexion: 4  Extension: 4-  Abduction: 4-  External rotation: 4+    Right Hip   Planes of Motion   Flexion: 5  Extension: 4-  Abduction: 4-  External rotation: 4+    Left Knee   Flexion: 5  Extension: 5    Right Knee   Flexion: 5  Extension: 5    Left Ankle/Foot   Dorsiflexion: 5  Plantar flexion: 5    Right Ankle/Foot   Dorsiflexion: 5  Plantar flexion: 5    Tests       Thoracic   Negative slump.           Assessment & Plan     Assessment  Impairments: abnormal muscle firing, abnormal or restricted ROM, activity intolerance, impaired physical strength, lacks appropriate home exercise program and pain with function  Functional Limitations: carrying objects, lifting, walking, uncomfortable because of pain, stooping and unable to perform repetitive tasks  Assessment details: The patient is a 76 yo male who presented to PT with evolving characteristics of subacute LBP with low complexity. Signs and symptoms are consistent with non-specific LBP with mobility deficits and hypertonic local musculature. He gets rapid improvement in symptoms with change in position and should respond well to a movement-based HEP. He reported reduced pain with PA mobs and STM to B QL and paraspinals and will benefit from manual therapy moving forward. He was educated on self massage and additional lumbar support while sitting. I expect the patient to make a timely recovery with  skilled PT intervention.     Prognosis: good    Goals  Plan Goals: Short Term Goals (4 weeks):     1. The patient will be independent and compliant with initial HEP.     2. The patient will report pain at rest 0/10 or less and worst pain 1/10 or less.    3. The patient will display decreased TTP in the lumbar spine and dec mm tension in the surrounding musculature.    4. B hip strength will improve to 4+/5 in abd and ext.     5. SLY will improve by 6 points or more.       Long Term Goals (8 weeks):     1. The patient will be appropriate for independent management and compliant with progressed HEP.     2. The patient will report pain at rest 0/10 or less and worst pain 0/10 or less.    3. The patient will return to work duties and/or ADLs with no limitations due to LBP.     4. The patient will return to recreational and community activities with no limitations due to LBP.       Plan  Therapy options: will be seen for skilled therapy services  Planned modality interventions: cryotherapy, electrical stimulation/Russian stimulation, TENS, iontophoresis, thermotherapy (hydrocollator packs) and traction  Planned therapy interventions: abdominal trunk stabilization, manual therapy, motor coordination training, neuromuscular re-education, ADL retraining, body mechanics training, flexibility, functional ROM exercises, home exercise program, joint mobilization, postural training, soft tissue mobilization, spinal/joint mobilization, strengthening, stretching and therapeutic activities  Frequency: 1x week  Duration in visits: 8  Duration in weeks: 8  Treatment plan discussed with: patient  Plan details: The patient will likely benefit from TE/NMED to include postural reeducation and core strengthening, MT for improved joint mobility, and TA for activity modification and lifting mechanics. Modalities will be utilized as needed for pain modulation. Dry needling as indicated.         History # of Personal Factors and/or  Comorbidities: LOW (0)  Examination of Body System(s): # of elements: LOW (1-2)  Clinical Presentation: EVOLVING  Clinical Decision Making: LOW       Timed:         Manual Therapy:    15     mins  17458;     Therapeutic Exercise:    8     mins  40579;     Neuromuscular Jan:    0    mins  57901;    Therapeutic Activity:     0     mins  17691;     Gait Trainin     mins  97905;     Ultrasound:     0     mins  89139;    Ionto                               0    mins   95398  Self Care                       0     mins   64672  Canalith Repos    0     mins 85229      Un-Timed:  Electrical Stimulation:    0     mins  08033 ( );  Dry Needling     0     mins self-pay  Traction     0     mins 93870  Low Eval     22     Mins  56197  Mod Eval     0     Mins  95153  High Eval                       0     Mins  03895        Timed Treatment:   23   mins   Total Treatment:     45   mins          PT: Max Griffin PT     License Number: [id number]  Electronically signed by Max Griffin PT, 23, 3:14 PM EST    Certification Period: 2023 thru 4/3/2023  I certify that the therapy services are furnished while this patient is under my care.  The services outlined above are required by this patient, and will be reviewed every 90 days.         Physician Signature:__________________________________________________    PHYSICIAN: Carlos Enrique Jamison PA  NPI: [id number]                                      DATE:      Please sign and return via fax to [email or web address] . Thank you, [facility].

## 2023-01-11 ENCOUNTER — TREATMENT (OUTPATIENT)
Dept: PHYSICAL THERAPY | Facility: CLINIC | Age: 78
End: 2023-01-11
Payer: MEDICARE

## 2023-01-11 DIAGNOSIS — M54.50 ACUTE BILATERAL LOW BACK PAIN WITHOUT SCIATICA: Primary | ICD-10-CM

## 2023-01-11 DIAGNOSIS — M79.10 MUSCLE TENSION PAIN: ICD-10-CM

## 2023-01-11 PROCEDURE — 97110 THERAPEUTIC EXERCISES: CPT | Performed by: PHYSICAL THERAPIST

## 2023-01-11 PROCEDURE — 97140 MANUAL THERAPY 1/> REGIONS: CPT | Performed by: PHYSICAL THERAPIST

## 2023-01-11 NOTE — PROGRESS NOTES
Physical Therapy Daily Treatment Note  [facility] [place] [facility] [place]  [address]  [place] [address]      Patient: Shaka Gurrola   : 1945  Referring practitioner: SAMUEL Wilson  Date of Initial Visit: Type: THERAPY  Noted: 2023  Today's Date: 2023  Patient seen for 2 sessions       Visit Diagnoses:    ICD-10-CM ICD-9-CM   1. Acute bilateral low back pain without sciatica  M54.50 724.2     338.19   2. Muscle tension pain  M79.10 729.1       Subjective Evaluation    History of Present Illness  Mechanism of injury: The pt stated that his back pain has improved by about 50% since his last visit. He is unsure if this is due to the manual therapy performed last visit, his HEP, or d/c of his Cialis medication this week. He has noticed an increase in urinary frequency since that time. He has been performing self massage with a ball and feels it helps.     Pain  Current pain ratin  Location: LBP           Objective   See Exercise, Manual, and Modality Logs for complete treatment.       Assessment & Plan     Assessment    Assessment details: The pt saw 50% improvement in his back pain this week but was unsure if it was due to PT or a medication change. He has been having urinary urgency since discontinuing his medication and this was not done at the recommendation of his physician, so he was encouraged to return to medication use but to continue his PT and HEP. He struggled with core stabilization and will need ongoing exercise to improve this. MT was continued to mobilize the lumbar spine and to reduce pain and was tolerated well.     Plan  Plan details: Continue MT and core stabilization exercises.          Timed:         Manual Therapy:    30     mins  03155;     Therapeutic Exercise:    8     mins  40658;     Neuromuscular Jan:    0    mins  09845;    Therapeutic Activity:     0     mins  64838;     Gait Trainin     mins  35589;     Ultrasound:     0     mins  66287;    Ionto                                0    mins   48933  Self Care                       0     mins   07647  Canalith Repos    0     mins 95179      Un-Timed:  Electrical Stimulation:    0     mins  40836 ( );  Dry Needling     0     mins self-pay  Traction     0     mins 76188      Timed Treatment:   38   mins   Total Treatment:     38   mins    Max Griffin, PT  [place] License: [id number]

## 2023-01-16 RX ORDER — CELECOXIB 200 MG/1
200 CAPSULE ORAL DAILY
Qty: 14 CAPSULE | Refills: 0 | Status: SHIPPED | OUTPATIENT
Start: 2023-01-16 | End: 2023-01-30

## 2023-01-16 NOTE — TELEPHONE ENCOUNTER
Rx Refill Note  Requested Prescriptions     Pending Prescriptions Disp Refills   • celecoxib (CeleBREX) 200 MG capsule [Pharmacy Med Name: CELECOXIB 200MG CAPSULES] 21 capsule 0     Sig: TAKE 1 CAPSULE BY MOUTH DAILY FOR 21 DAYS      Last office visit with prescribing clinician: 12/21/2022   Last telemedicine visit with prescribing clinician: 3/21/2023   Next office visit with prescribing clinician: 3/21/2023                         Would you like a call back once the refill request has been completed: [] Yes [] No    If the office needs to give you a call back, can they leave a voicemail: [] Yes [] No    Fidelia Momin MA  01/16/23, 08:51 EST

## 2023-01-25 ENCOUNTER — TREATMENT (OUTPATIENT)
Dept: PHYSICAL THERAPY | Facility: CLINIC | Age: 78
End: 2023-01-25
Payer: MEDICARE

## 2023-01-25 DIAGNOSIS — M79.10 MUSCLE TENSION PAIN: ICD-10-CM

## 2023-01-25 DIAGNOSIS — M54.50 ACUTE BILATERAL LOW BACK PAIN WITHOUT SCIATICA: Primary | ICD-10-CM

## 2023-01-25 PROCEDURE — 97110 THERAPEUTIC EXERCISES: CPT | Performed by: PHYSICAL THERAPIST

## 2023-01-25 PROCEDURE — 97140 MANUAL THERAPY 1/> REGIONS: CPT | Performed by: PHYSICAL THERAPIST

## 2023-01-25 NOTE — PROGRESS NOTES
Physical Therapy Daily Treatment Note  [facility] [place] [facility]- [place]  [address], [facility] [facility] [address] [facility] [address]  [place] [address]      Patient: Shaka Gurrola   : 1945  Referring practitioner: SAMUEL Wilson  Date of Initial Visit: Type: THERAPY  Noted: 2023  Today's Date: 2023  Patient seen for 3 sessions       Visit Diagnoses:    ICD-10-CM ICD-9-CM   1. Acute bilateral low back pain without sciatica  M54.50 724.2     338.19   2. Muscle tension pain  M79.10 729.1       Subjective Evaluation    History of Present Illness  Mechanism of injury: The pt stated that his back has been feeling much better and he feels he is responding well to his HEP exercises. He believes manual therapy is helping. [place] 2 [place] and hopes to manage independently upon return.     Pain  Current pain ratin  Location: LBP           Objective   See Exercise, Manual, and Modality Logs for complete treatment.       Assessment & Plan     Assessment    Assessment details: The pt has responded very well to manual interventions and flexion based stretching and core strengthening, and reported reduced intensity of low back pain. Manual interventions were resumed again today with increased emphasis on the TL junction. This was tolerated well with no complaints of pain and decreased symptoms thereafter. His HEP was reviewed and progressed for a period of independent management. He is doing well and should be able to manage his condition independently moving forward.     Plan  Plan details: Pt to attempt independent management and call for f/u as needed. If no additional visits are scheduled in 30 days this will serve as a d/c note.            Timed:         Manual Therapy:    30     mins  69584;     Therapeutic Exercise:    8     mins  40826;     Neuromuscular Jan:    0    mins  55804;    Therapeutic Activity:     0     mins  19054;     Gait Trainin     mins  25279;     Ultrasound:     0     mins   03264;    Ionto                               0    mins   79761  Self Care                       0     mins   09436  Canalith Repos    0     mins 93802      Un-Timed:  Electrical Stimulation:    0     mins  14995 ( );  Dry Needling     0     mins self-pay  Traction     0     mins 00582      Timed Treatment:   38   mins   Total Treatment:     38   mins    Max Griffin, PT  [place] License: [id number]

## 2023-01-27 RX ORDER — SEMAGLUTIDE 1.34 MG/ML
1 INJECTION, SOLUTION SUBCUTANEOUS
Qty: 9 ML | Refills: 0 | Status: SHIPPED | OUTPATIENT
Start: 2023-01-27 | End: 2023-03-27 | Stop reason: SDUPTHER

## 2023-01-27 NOTE — TELEPHONE ENCOUNTER
Rx Refill Note  Requested Prescriptions     Pending Prescriptions Disp Refills   • Semaglutide, 1 MG/DOSE, (Ozempic, 1 MG/DOSE,) 4 MG/3ML solution pen-injector 3 mL 5     Sig: Inject 1 mg under the skin into the appropriate area as directed Every 7 (Seven) Days.      Last office visit with prescribing clinician: 12/21/2022   Last telemedicine visit with prescribing clinician: 3/21/2023   Next office visit with prescribing clinician: 3/21/2023                         Would you like a call back once the refill request has been completed: [] Yes [] No    If the office needs to give you a call back, can they leave a voicemail: [] Yes [] No    Inge Mei MA  01/27/23, 12:40 EST

## 2023-02-04 NOTE — TELEPHONE ENCOUNTER
Rx Refill Note  Requested Prescriptions     Pending Prescriptions Disp Refills   • sildenafil (Viagra) 100 MG tablet 30 tablet 5     Sig: Take 1 tablet by mouth Daily As Needed for Erectile Dysfunction.   • tadalafil (CIALIS) 5 MG tablet        Last office visit with prescribing clinician: 12/21/2022   Last telemedicine visit with prescribing clinician: 3/21/2023   Next office visit with prescribing clinician: 3/21/2023                         Would you like a call back once the refill request has been completed: [] Yes [] No    If the office needs to give you a call back, can they leave a voicemail: [] Yes [] No    Inge Mei MA  02/04/23, 11:09 EST

## 2023-02-06 RX ORDER — SILDENAFIL 100 MG/1
100 TABLET, FILM COATED ORAL DAILY PRN
Qty: 10 TABLET | Refills: 3 | Status: SHIPPED | OUTPATIENT
Start: 2023-02-06

## 2023-02-06 RX ORDER — TADALAFIL 5 MG/1
5 TABLET ORAL DAILY
Qty: 90 TABLET | Refills: 1 | Status: SHIPPED | OUTPATIENT
Start: 2023-02-06

## 2023-03-25 RX ORDER — LOSARTAN POTASSIUM 100 MG/1
100 TABLET ORAL DAILY
Qty: 90 TABLET | Refills: 3 | Status: CANCELLED | OUTPATIENT
Start: 2023-03-25

## 2023-03-25 RX ORDER — ROSUVASTATIN CALCIUM 5 MG/1
5 TABLET, COATED ORAL DAILY
Qty: 90 TABLET | Refills: 1 | Status: CANCELLED | OUTPATIENT
Start: 2023-03-25

## 2023-03-27 ENCOUNTER — LAB (OUTPATIENT)
Dept: LAB | Facility: HOSPITAL | Age: 78
End: 2023-03-27
Payer: MEDICARE

## 2023-03-27 ENCOUNTER — OFFICE VISIT (OUTPATIENT)
Dept: FAMILY MEDICINE CLINIC | Facility: CLINIC | Age: 78
End: 2023-03-27
Payer: MEDICARE

## 2023-03-27 VITALS
SYSTOLIC BLOOD PRESSURE: 122 MMHG | WEIGHT: 245 LBS | TEMPERATURE: 97.1 F | OXYGEN SATURATION: 98 % | DIASTOLIC BLOOD PRESSURE: 78 MMHG | BODY MASS INDEX: 34.3 KG/M2 | HEART RATE: 59 BPM | HEIGHT: 71 IN

## 2023-03-27 DIAGNOSIS — E66.09 CLASS 1 OBESITY DUE TO EXCESS CALORIES WITH SERIOUS COMORBIDITY AND BODY MASS INDEX (BMI) OF 34.0 TO 34.9 IN ADULT: ICD-10-CM

## 2023-03-27 DIAGNOSIS — I25.10 CORONARY ARTERY DISEASE INVOLVING NATIVE CORONARY ARTERY OF NATIVE HEART WITHOUT ANGINA PECTORIS: ICD-10-CM

## 2023-03-27 DIAGNOSIS — R97.20 ELEVATED PSA: ICD-10-CM

## 2023-03-27 DIAGNOSIS — I25.10 CORONARY ARTERY DISEASE INVOLVING NATIVE CORONARY ARTERY OF NATIVE HEART WITHOUT ANGINA PECTORIS: Primary | ICD-10-CM

## 2023-03-27 DIAGNOSIS — I10 HYPERTENSION, UNSPECIFIED TYPE: ICD-10-CM

## 2023-03-27 LAB
ALBUMIN SERPL-MCNC: 3.8 G/DL (ref 3.5–5.2)
ALBUMIN/GLOB SERPL: 1.1 G/DL
ALP SERPL-CCNC: 65 U/L (ref 39–117)
ALT SERPL W P-5'-P-CCNC: 6 U/L (ref 1–41)
ANION GAP SERPL CALCULATED.3IONS-SCNC: 9 MMOL/L (ref 5–15)
AST SERPL-CCNC: 14 U/L (ref 1–40)
BILIRUB SERPL-MCNC: 0.7 MG/DL (ref 0–1.2)
BUN SERPL-MCNC: 17 MG/DL (ref 8–23)
BUN/CREAT SERPL: 15 (ref 7–25)
CALCIUM SPEC-SCNC: 9.4 MG/DL (ref 8.6–10.5)
CHLORIDE SERPL-SCNC: 106 MMOL/L (ref 98–107)
CHOLEST SERPL-MCNC: 161 MG/DL (ref 0–200)
CO2 SERPL-SCNC: 26 MMOL/L (ref 22–29)
CREAT SERPL-MCNC: 1.13 MG/DL (ref 0.76–1.27)
EGFRCR SERPLBLD CKD-EPI 2021: 67.8 ML/MIN/1.73
GLOBULIN UR ELPH-MCNC: 3.6 GM/DL
GLUCOSE SERPL-MCNC: 91 MG/DL (ref 65–99)
HDLC SERPL-MCNC: 65 MG/DL (ref 40–60)
LDLC SERPL CALC-MCNC: 84 MG/DL (ref 0–100)
LDLC/HDLC SERPL: 1.3 {RATIO}
POTASSIUM SERPL-SCNC: 4.5 MMOL/L (ref 3.5–5.2)
PROT SERPL-MCNC: 7.4 G/DL (ref 6–8.5)
PSA SERPL-MCNC: 12.6 NG/ML (ref 0–4)
SODIUM SERPL-SCNC: 141 MMOL/L (ref 136–145)
TRIGL SERPL-MCNC: 59 MG/DL (ref 0–150)
VLDLC SERPL-MCNC: 12 MG/DL (ref 5–40)

## 2023-03-27 PROCEDURE — 84153 ASSAY OF PSA TOTAL: CPT

## 2023-03-27 PROCEDURE — 1160F RVW MEDS BY RX/DR IN RCRD: CPT | Performed by: PHYSICIAN ASSISTANT

## 2023-03-27 PROCEDURE — 1159F MED LIST DOCD IN RCRD: CPT | Performed by: PHYSICIAN ASSISTANT

## 2023-03-27 PROCEDURE — 3078F DIAST BP <80 MM HG: CPT | Performed by: PHYSICIAN ASSISTANT

## 2023-03-27 PROCEDURE — 3074F SYST BP LT 130 MM HG: CPT | Performed by: PHYSICIAN ASSISTANT

## 2023-03-27 PROCEDURE — 80061 LIPID PANEL: CPT

## 2023-03-27 PROCEDURE — 99214 OFFICE O/P EST MOD 30 MIN: CPT | Performed by: PHYSICIAN ASSISTANT

## 2023-03-27 PROCEDURE — 80053 COMPREHEN METABOLIC PANEL: CPT

## 2023-03-27 RX ORDER — SEMAGLUTIDE 1.34 MG/ML
1 INJECTION, SOLUTION SUBCUTANEOUS
Qty: 9 ML | Refills: 2 | Status: SHIPPED | OUTPATIENT
Start: 2023-03-27

## 2023-03-27 NOTE — PROGRESS NOTES
Chief Complaint   Patient presents with   • Hypertension     6 month f/u    • Hyperlipidemia       HPI     Shaka Gurrola is a 77 y.o. male with a PMH of CAD, HTN, IBS, BPH, elevated PSA, and osteoarthritis who is here for 6 month follow-up of chronic conditions.     Denies dyspnea and chest pain. He walks every other day or so.   He sees his urologist on Friday for his history of elevated PSA. He requests PSA monitoring for this appointment.   Has not been taking rosuvastatin for the last couple of months. He did not try taking it every other day. He had side-effects with higher dosages.    Checks blood pressures at home infrequently. He denies high readings.   He is on Ozempic for weight reduction. This was initially started by his provider at the [facility]. He did lose about 20 pounds after starting this medication.     Past Medical History:   Diagnosis Date   • Atrophic testicle     Left   • BPH with elevated PSA    • Hiatal hernia with gastroesophageal reflux    • Hx of iron deficiency anemia    • Inguinal hernia     Right       Past Surgical History:   Procedure Laterality Date   • ANKLE SURGERY Left     Plate   • CORONARY ANGIOPLASTY WITH STENT PLACEMENT     • KNEE ARTHROSCOPY Right    • PROSTATE BIOPSY     • TOTAL HIP ARTHROPLASTY Right 01/14/2019    Dr. Fernandez   • TOTAL HIP ARTHROPLASTY REVISION Left        Family History   Problem Relation Age of Onset   • Aneurysm Mother    • Lung cancer Father        Social History     Socioeconomic History   • Marital status:    Tobacco Use   • Smoking status: Never   • Smokeless tobacco: Never   Vaping Use   • Vaping Use: Never used   Substance and Sexual Activity   • Alcohol use: Yes     Alcohol/week: 17.0 standard drinks     Types: 7 Glasses of wine, 10 Shots of liquor per week   • Drug use: No   • Sexual activity: Yes     Partners: Female       Allergies   Allergen Reactions   • Amlodipine Other (See Comments)     Leg swelling, shortness of breath        ROS    Review of Systems   Eyes: Negative for blurred vision.   Respiratory: Negative for shortness of breath.    Cardiovascular: Negative for chest pain and leg swelling.   Neurological: Negative for dizziness and headache.       Vitals:    03/27/23 1107   BP: 122/78   Pulse: 59   Temp: 97.1 °F (36.2 °C)   SpO2: 98%     Body mass index is 34.17 kg/m².      Current Outpatient Medications:   •  aspirin 81 MG EC tablet, Take 1 tablet by mouth Daily., Disp: , Rfl:   •  esomeprazole (nexIUM) 40 MG capsule, Take 1 capsule by mouth Every Morning Before Breakfast., Disp: , Rfl:   •  losartan (COZAAR) 100 MG tablet, Take 1 tablet by mouth Daily., Disp: 90 tablet, Rfl: 3  •  rosuvastatin (CRESTOR) 5 MG tablet, Take 1 tablet by mouth Daily., Disp: 90 tablet, Rfl: 1  •  Semaglutide, 1 MG/DOSE, (Ozempic, 1 MG/DOSE,) 4 MG/3ML solution pen-injector, Inject 1 mg under the skin into the appropriate area as directed Every 7 (Seven) Days., Disp: 9 mL, Rfl: 2  •  sildenafil (Viagra) 100 MG tablet, Take 1 tablet by mouth Daily As Needed for Erectile Dysfunction., Disp: 10 tablet, Rfl: 3  •  tadalafil (CIALIS) 5 MG tablet, Take 1 tablet by mouth Daily., Disp: 90 tablet, Rfl: 1    PE    Physical Exam  Vitals reviewed.   Constitutional:       General: He is not in acute distress.     Appearance: He is well-developed.   HENT:      Head: Normocephalic and atraumatic.   Eyes:      Conjunctiva/sclera: Conjunctivae normal.   Cardiovascular:      Rate and Rhythm: Normal rate and regular rhythm.      Heart sounds: Normal heart sounds. No murmur heard.  Pulmonary:      Effort: Pulmonary effort is normal.      Breath sounds: Normal breath sounds.   Musculoskeletal:      Cervical back: Normal range of motion.   Skin:     General: Skin is warm and dry.   Neurological:      Mental Status: He is alert.      Gait: Gait normal.   Psychiatric:         Speech: Speech normal.         Behavior: Behavior normal.         Results    Results for orders  placed or performed in visit on 03/17/22   COVID-19 PCR, [facility], NP SWAB IN [facility] TRANSPORT MEDIA/ORAL SWISH 24-30 HR TAT - Swab, Nasopharynx    Specimen: Nasopharynx; Swab   Result Value Ref Range    SARS-CoV-2 JOHNNY Not Detected Not Detected       A/P    Problem List Items Addressed This Visit        Cardiac and Vasculature    Hypertension    Current Assessment & Plan     Excellent control.   Continue losartan.          Coronary artery disease - Primary    Overview     2013: stents x2  Followed by [facility] cardiology, CORY Avalos         Current Assessment & Plan     Presently asymptomatic.   Not currently taking statin. Pt wants to see what his cholesterol is before resuming medication. He completed labs this morning which are still pending. Further recommendations pending his results.   RTC in 6 months for a wellness visit, sooner prn.             Endocrine and Metabolic    Class 1 obesity due to excess calories with serious comorbidity and body mass index (BMI) of 34.0 to 34.9 in adult    Current Assessment & Plan     He would like to continue Ozempic for appetite suppression, weight reduction, and CV risk reduction.   Refilled Ozempic. Encouraged dietary improvement and regular exercise.             Genitourinary and Reproductive     Elevated PSA    Overview     Followed by urology, Dr. Rader         Relevant Orders    PSA DIAGNOSTIC       Plan of care was reviewed with patient at the conclusion of today's visit. Education was provided regarding diagnoses, management, and the importance of keeping follow-up appointments. The patient was counseled regarding the risks, benefits, and possible side-effects of treatment. Patient and/or family express understanding and agreement with the management plan.        SAMUEL Wilson

## 2023-03-27 NOTE — ASSESSMENT & PLAN NOTE
He would like to continue Ozempic for appetite suppression, weight reduction, and CV risk reduction.   Refilled Ozempic. Encouraged dietary improvement and regular exercise.

## 2023-03-27 NOTE — ASSESSMENT & PLAN NOTE
Presently asymptomatic.   Not currently taking statin. Pt wants to see what his cholesterol is before resuming medication. He completed labs this morning which are still pending. Further recommendations pending his results.   RTC in 6 months for a wellness visit, sooner prn.

## 2023-03-28 RX ORDER — ROSUVASTATIN CALCIUM 5 MG/1
5 TABLET, COATED ORAL EVERY OTHER DAY
Qty: 45 TABLET | Refills: 3 | Status: SHIPPED | OUTPATIENT
Start: 2023-03-28

## 2023-05-09 RX ORDER — SEMAGLUTIDE 1.34 MG/ML
INJECTION, SOLUTION SUBCUTANEOUS
Qty: 4.5 ML | Refills: 1 | Status: SHIPPED | OUTPATIENT
Start: 2023-05-09

## 2023-05-09 NOTE — TELEPHONE ENCOUNTER
Rx Refill Note  Requested Prescriptions     Pending Prescriptions Disp Refills   • Ozempic, 0.25 or 0.5 MG/DOSE, 2 MG/1.5ML solution pen-injector [Pharmacy Med Name: OZEMPIC 0.25 OR 0.5MG/DOSE PN 1.5ML] 4.5 mL      Sig: INJECT 0.5MG UNDER THE SKIN INTO THE APPROPRIATE AREA AS DIRECTED ONCE PER WEEK      Last office visit with prescribing clinician: 3/27/2023   Next office visit with prescribing clinician: 10/2/2023   Taylor Naqvi MA  05/09/23, 14:18 EDT

## 2023-09-22 RX ORDER — LOSARTAN POTASSIUM 100 MG/1
100 TABLET ORAL DAILY
Qty: 90 TABLET | Refills: 0 | Status: SHIPPED | OUTPATIENT
Start: 2023-09-22

## 2023-10-16 ENCOUNTER — OFFICE VISIT (OUTPATIENT)
Dept: FAMILY MEDICINE CLINIC | Facility: CLINIC | Age: 78
End: 2023-10-16
Payer: MEDICARE

## 2023-10-16 VITALS
BODY MASS INDEX: 34.38 KG/M2 | DIASTOLIC BLOOD PRESSURE: 82 MMHG | WEIGHT: 245.6 LBS | HEIGHT: 71 IN | HEART RATE: 64 BPM | OXYGEN SATURATION: 95 % | SYSTOLIC BLOOD PRESSURE: 124 MMHG

## 2023-10-16 DIAGNOSIS — R73.9 ELEVATED SERUM GLUCOSE: ICD-10-CM

## 2023-10-16 DIAGNOSIS — I10 HYPERTENSION, UNSPECIFIED TYPE: ICD-10-CM

## 2023-10-16 DIAGNOSIS — R97.20 ELEVATED PSA: ICD-10-CM

## 2023-10-16 DIAGNOSIS — E66.09 CLASS 1 OBESITY DUE TO EXCESS CALORIES WITH SERIOUS COMORBIDITY AND BODY MASS INDEX (BMI) OF 34.0 TO 34.9 IN ADULT: ICD-10-CM

## 2023-10-16 DIAGNOSIS — Z00.00 MEDICARE ANNUAL WELLNESS VISIT, SUBSEQUENT: Primary | ICD-10-CM

## 2023-10-16 DIAGNOSIS — I25.10 ATHEROSCLEROSIS OF NATIVE CORONARY ARTERY OF NATIVE HEART WITHOUT ANGINA PECTORIS: ICD-10-CM

## 2023-10-16 DIAGNOSIS — Z86.010 HISTORY OF COLON POLYPS: ICD-10-CM

## 2023-10-16 DIAGNOSIS — Z85.828 HISTORY OF BASAL CELL CARCINOMA (BCC): ICD-10-CM

## 2023-10-16 LAB
EXPIRATION DATE: NORMAL
HBA1C MFR BLD: 5.1 % (ref 4.5–5.7)
Lab: NORMAL

## 2023-10-16 PROCEDURE — 1170F FXNL STATUS ASSESSED: CPT | Performed by: PHYSICIAN ASSISTANT

## 2023-10-16 PROCEDURE — 3079F DIAST BP 80-89 MM HG: CPT | Performed by: PHYSICIAN ASSISTANT

## 2023-10-16 PROCEDURE — 3044F HG A1C LEVEL LT 7.0%: CPT | Performed by: PHYSICIAN ASSISTANT

## 2023-10-16 PROCEDURE — 1159F MED LIST DOCD IN RCRD: CPT | Performed by: PHYSICIAN ASSISTANT

## 2023-10-16 PROCEDURE — 1160F RVW MEDS BY RX/DR IN RCRD: CPT | Performed by: PHYSICIAN ASSISTANT

## 2023-10-16 PROCEDURE — 3074F SYST BP LT 130 MM HG: CPT | Performed by: PHYSICIAN ASSISTANT

## 2023-10-16 PROCEDURE — G0008 ADMIN INFLUENZA VIRUS VAC: HCPCS | Performed by: PHYSICIAN ASSISTANT

## 2023-10-16 PROCEDURE — G0439 PPPS, SUBSEQ VISIT: HCPCS | Performed by: PHYSICIAN ASSISTANT

## 2023-10-16 PROCEDURE — 83036 HEMOGLOBIN GLYCOSYLATED A1C: CPT | Performed by: PHYSICIAN ASSISTANT

## 2023-10-16 PROCEDURE — 90662 IIV NO PRSV INCREASED AG IM: CPT | Performed by: PHYSICIAN ASSISTANT

## 2023-10-16 RX ORDER — ALPRAZOLAM 1 MG/1
TABLET ORAL
COMMUNITY
Start: 2023-07-03

## 2023-10-16 NOTE — PROGRESS NOTES
QUICK REFERENCE INFORMATION:  The ABCs of the Annual Wellness Visit    Medicare Subsequent Wellness Visit    Chief Complaint   Patient presents with    Medicare Wellness-subsequent        HPI     Shaka Gurrola is a 78 y.o. male with a PMH of CAD, HTN, elevated PSA, BPH, IBS, basal cell carcinoma, and osteoarthritis presenting for subsequent annual wellness visit.     He plans to have executive evaluation at [facility] in February or March next year.    Not taking rosuvastatin.   He does not feel Ozempic is working. He has GERD the first couple days after his dose but is otherwise tolerating this medication well. He would like to lose more weight. He reports a healthy diet. He bought exercise equipment and started routine regimen 3 weeks ago. Has tried Keto diet in the past without significant success. He was prescribed Ozempic originally by a [facility] provider.   Sees dermatology later this month for a spot on his back his wife was concerned about. It has been there for months.   Scotch or wine 2 drinks nightly with supper.   Follows with urology for his history of elevated PSA.     Past Medical History:   Diagnosis Date    Atrophic testicle     Left    BPH with elevated PSA     Hiatal hernia with gastroesophageal reflux     Hx of iron deficiency anemia     Inguinal hernia     Right      Past Surgical History:   Procedure Laterality Date    ANKLE SURGERY Left     Plate    CORONARY ANGIOPLASTY WITH STENT PLACEMENT      KNEE ARTHROSCOPY Right     PROSTATE BIOPSY      TOTAL HIP ARTHROPLASTY Right 01/14/2019    Dr. Fernandez    TOTAL HIP ARTHROPLASTY REVISION Left      Family History   Problem Relation Age of Onset    Aneurysm Mother     Lung cancer Father       Social History     Socioeconomic History    Marital status:    Tobacco Use    Smoking status: Never    Smokeless tobacco: Never   Vaping Use    Vaping Use: Never used   Substance and Sexual Activity    Alcohol use: Yes     Alcohol/week: 17.0 standard  drinks of alcohol     Types: 7 Glasses of wine, 10 Shots of liquor per week    Drug use: No    Sexual activity: Yes     Partners: Female      Allergies   Allergen Reactions    Amlodipine Other (See Comments)     Leg swelling, shortness of breath      Outpatient Medications Prior to Visit   Medication Sig Dispense Refill    ALPRAZolam (XANAX) 1 MG tablet TAKE 1 TABLET BY MOUTH 90 MINUTES PRIOR TO PROCEDURE. IF STILL ANXIOUS AFTER 45 MINUTES TAKE SECOND TABLET. BRING THIRD TABLET TO APPOINTMENT      aspirin 81 MG EC tablet Take 1 tablet by mouth Daily.      esomeprazole (nexIUM) 40 MG capsule Take 1 capsule by mouth Every Morning Before Breakfast.      losartan (COZAAR) 100 MG tablet TAKE 1 TABLET BY MOUTH DAILY 90 tablet 0    tadalafil (CIALIS) 5 MG tablet Take 1 tablet by mouth Daily. 90 tablet 1    Ozempic, 0.25 or 0.5 MG/DOSE, 2 MG/1.5ML solution pen-injector INJECT 0.5MG UNDER THE SKIN INTO THE APPROPRIATE AREA AS DIRECTED ONCE PER WEEK 4.5 mL 1    Semaglutide, 1 MG/DOSE, (Ozempic, 1 MG/DOSE,) 4 MG/3ML solution pen-injector Inject 1 mg under the skin into the appropriate area as directed Every 7 (Seven) Days. 9 mL 2    sildenafil (Viagra) 100 MG tablet Take 1 tablet by mouth Daily As Needed for Erectile Dysfunction. 10 tablet 3    rosuvastatin (CRESTOR) 5 MG tablet Take 1 tablet by mouth Every Other Day. (Patient not taking: Reported on 10/16/2023) 45 tablet 3     No facility-administered medications prior to visit.       Reviewed use of high risk medication in the elderly: yes  Reviewed for potential of harmful drug interactions in the elderly: yes    The following portions of the patient's history were reviewed and updated as appropriate: allergies, current medications, past family history, past medical history, past social history, past surgical history, and problem list.    Review of Systems   Constitutional:  Negative for chills, diaphoresis, fever and unexpected weight loss.   HENT:  Negative for  "congestion, hearing loss, sore throat, swollen glands and trouble swallowing.    Eyes:  Negative for blurred vision and visual disturbance.   Respiratory:  Negative for cough.    Gastrointestinal:  Positive for GERD. Negative for abdominal pain, blood in stool, constipation, nausea and vomiting.   Endocrine: Negative for polyuria.   Genitourinary:  Positive for nocturia (x1, stable) and scrotal swelling. Negative for difficulty urinating, dysuria and testicular pain.   Musculoskeletal:  Negative for arthralgias, gait problem and myalgias.   Skin:  Positive for skin lesions. Negative for rash.   Neurological:  Negative for dizziness, numbness and headache.   Hematological:  Negative for adenopathy.   Psychiatric/Behavioral:  Negative for sleep disturbance and depressed mood. The patient is not nervous/anxious.         Vitals:    10/16/23 1344   BP: 124/82   Pulse: 64   SpO2: 95%   Weight: 111 kg (245 lb 9.6 oz)   Height: 180.3 cm (70.98\")     Body mass index is 34.27 kg/m².    Objective    Physical Exam  Vitals reviewed.   Constitutional:       General: He is not in acute distress.     Appearance: He is well-developed. He is obese.   HENT:      Head: Normocephalic and atraumatic.      Right Ear: Hearing and tympanic membrane normal.      Left Ear: Hearing and tympanic membrane normal.      Mouth/Throat:      Mouth: Mucous membranes are moist.      Dentition: Normal dentition.      Pharynx: Oropharynx is clear.   Eyes:      Extraocular Movements: Extraocular movements intact.      Conjunctiva/sclera: Conjunctivae normal.      Pupils: Pupils are equal, round, and reactive to light.      Comments:      Neck:      Thyroid: No thyroid mass, thyromegaly or thyroid tenderness.      Vascular: No carotid bruit.   Cardiovascular:      Rate and Rhythm: Normal rate and regular rhythm.      Pulses:           Dorsalis pedis pulses are 2+ on the right side and 2+ on the left side.        Posterior tibial pulses are 2+ on the right " side and 2+ on the left side.      Heart sounds: No murmur heard.     No friction rub.   Pulmonary:      Effort: Pulmonary effort is normal.      Breath sounds: Normal breath sounds. No wheezing, rhonchi or rales.   Abdominal:      General: Bowel sounds are normal. There is no abdominal bruit.      Palpations: Abdomen is soft. There is no mass.      Tenderness: There is no abdominal tenderness.   Musculoskeletal:         General: Normal range of motion.      Cervical back: Normal range of motion and neck supple.   Lymphadenopathy:      Cervical: No cervical adenopathy.      Upper Body:      Right upper body: No supraclavicular adenopathy.      Left upper body: No supraclavicular adenopathy.   Skin:     General: Skin is warm and dry.      Findings: No rash.      Nails: There is no clubbing.      Comments: No suspicious nevi on clothed exam.    Neurological:      Mental Status: He is alert.      Gait: Gait normal.      Deep Tendon Reflexes: Reflexes normal.   Psychiatric:         Mood and Affect: Mood normal.         Speech: Speech normal.         Behavior: Behavior normal.          HEALTH RISK ASSESSMENT    1945    Recent Hospitalizations:  No hospitalization(s) within the last year..      Current Medical Providers:  Patient Care Team:  Carlos Enrique Jamison PA as PCP - General (Physician Assistant)  Pedro Gonzalez MD as Consulting Physician (Dermatology)      Smoking Status:  Social History     Tobacco Use   Smoking Status Never   Smokeless Tobacco Never       Alcohol Consumption:  Social History     Substance and Sexual Activity   Alcohol Use Yes    Alcohol/week: 17.0 standard drinks of alcohol    Types: 7 Glasses of wine, 10 Shots of liquor per week       Depression Screen:       10/16/2023     1:55 PM   PHQ-2/PHQ-9 Depression Screening   Little Interest or Pleasure in Doing Things 0-->not at all   Feeling Down, Depressed or Hopeless 0-->not at all   PHQ-9: Brief Depression Severity Measure Score 0       Health  Habits and Functional and Cognitive Screening:      10/16/2023     1:54 PM   Functional & Cognitive Status   Do you have difficulty preparing food and eating? No   Do you have difficulty bathing yourself, getting dressed or grooming yourself? No   Do you have difficulty using the toilet? No   Do you have difficulty moving around from place to place? No   Do you have trouble with steps or getting out of a bed or a chair? No   Current Diet Well Balanced Diet   Dental Exam Up to date   Eye Exam Up to date   Exercise (times per week) 3 times per week   Current Exercises Include Treadmill   Do you need help using the phone?  No   Are you deaf or do you have serious difficulty hearing?  No   Do you need help to go to places out of walking distance? No   Do you need help shopping? No   Do you need help preparing meals?  No   Do you need help with housework?  No   Do you need help with laundry? No   Do you need help taking your medications? No   Do you need help managing money? No   Do you ever drive or ride in a car without wearing a seat belt? No   Have you felt unusual stress, anger or loneliness in the last month? No   Who do you live with? Spouse   If you need help, do you have trouble finding someone available to you? No   Have you been bothered in the last four weeks by sexual problems? No   Do you have difficulty concentrating, remembering or making decisions? No           Does the patient have evidence of cognitive impairment? No    Aspirin use counseling? Taking ASA appropriately as indicated      Recent Lab Results:  CMP:  Lab Results   Component Value Date    BUN 17 03/27/2023    CREATININE 1.13 03/27/2023    EGFRIFNONA 71 01/18/2022    EGFRIFAFRI 82 01/18/2022    BCR 15.0 03/27/2023     03/27/2023    K 4.5 03/27/2023    CO2 26.0 03/27/2023    CALCIUM 9.4 03/27/2023    ALBUMIN 3.8 03/27/2023    LABIL2 0.95 02/21/2020    BILITOT 0.7 03/27/2023    ALKPHOS 65 03/27/2023    AST 14 03/27/2023    ALT 6  03/27/2023     Lipid Panel:  Lab Results   Component Value Date    CHOL 161 03/27/2023    TRIG 59 03/27/2023    HDL 65 (H) 03/27/2023    VLDL 12 03/27/2023    LDLHDL 1.30 03/27/2023     HbA1c:  Lab Results   Component Value Date    HGBA1C 5.1 10/16/2023       Visual Acuity:  No results found.    Age-appropriate Screening Schedule:  Refer to the list below for future screening recommendations based on patient's age, sex and/or medical conditions. Orders for these recommended tests are listed in the plan section. The patient has been provided with a written plan.    Health Maintenance   Topic Date Due    COVID-19 Vaccine (8 - 2023-24 season) 09/01/2023    LIPID PANEL  03/27/2024    ANNUAL WELLNESS VISIT  10/16/2024    BMI FOLLOWUP  10/16/2024    TDAP/TD VACCINES (3 - Td or Tdap) 10/04/2027    COLORECTAL CANCER SCREENING  12/07/2027    HEPATITIS C SCREENING  Completed    INFLUENZA VACCINE  Completed    Pneumococcal Vaccine 65+  Completed    ZOSTER VACCINE  Completed          Advance Care Planning:  ACP discussion was held with the patient during this visit. Patient has an advance directive (not in EMR), copy requested.    Identification of Risk Factors:  Risk factors include: Advance Directive Discussion  Cardiovascular risk  Diabetic Lab Screening   Immunizations Discussed/Encouraged (specific immunizations; Influenza and COVID19 )  Obesity/Overweight .    Compared to one year ago, the patient feels his physical health is the same.  Compared to one year ago, the patient feels his mental health is the same.      Diagnoses and all orders for this visit:    1. Medicare annual wellness visit, subsequent (Primary)    2. Atherosclerosis of native coronary artery of native heart without angina pectoris    3. Hypertension, unspecified type    4. Class 1 obesity due to excess calories with serious comorbidity and body mass index (BMI) of 34.0 to 34.9 in adult  Assessment & Plan:  Patient's (Body mass index is 34.27 kg/m².)  indicates that they are obese (BMI >30) with health conditions that include hypertension, coronary heart disease, dyslipidemias, GERD, and osteoarthritis . Weight is worsening. BMI  is above average; BMI management plan is completed. We discussed portion control, increasing exercise, and pharmacologic options including Ozempic or Wegovy .       5. History of colon polyps    6. Elevated PSA    7. History of basal cell carcinoma (BCC)    8. Elevated serum glucose  -     POC Glycosylated Hemoglobin (Hb A1C)    Other orders  -     Fluzone High-Dose 65+yrs (7513-0721)  -     Semaglutide, 2 MG/DOSE, (OZEMPIC) 8 MG/3ML solution pen-injector; Inject 2 mg under the skin into the appropriate area as directed 1 (One) Time Per Week.  Dispense: 3 mL; Refill: 2        Procedure   Procedures       -Counseled patient regarding cancer screening, immunizations, healthy lifestyle, diet, maintaining a healthy weight, and exercise.   -Influenza vaccine provided.   -Annual dental and eye exams were encouraged.   -Continue skin checks with dermatology.   -Off label use Ozempic for obesity with multiple weight-related comorbidities including coronary atherosclerosis. History of elevated glucose in the past but Ha1c is normal today. Will increase dose to 2 mg weekly. Counseled patient to report any worsening GI side-effects. Dietary information provided today. May need to change to Wegovy depending on insurance coverage.   -Reviewed his LDL goal of <70. He is agreeable to start rosuvastatin. I asked him to let me know if he experiences any musculoskeletal side-effects.   -Continue follow-up with urology for history of BPH and elevated PSA.   -Defer labs at this time due to upcoming executive physical at the [facility] in a few months.   -RTC in 6 months, sooner prn.       Patient Self-Management and Personalized Health Advice  The patient has been provided with information about: diet, exercise, weight management, and designing advance  directives and preventive services including:   Annual Wellness Visit (AWV).      Follow Up:  Return in about 6 months (around 4/16/2024), or if symptoms worsen or fail to improve.     An After Visit Summary and PPPS with all of these plans were given to the patient.

## 2023-10-16 NOTE — ASSESSMENT & PLAN NOTE
Patient's (Body mass index is 34.27 kg/m².) indicates that they are obese (BMI >30) with health conditions that include hypertension, coronary heart disease, dyslipidemias, GERD, and osteoarthritis . Weight is worsening. BMI  is above average; BMI management plan is completed. We discussed portion control, increasing exercise, and pharmacologic options including Ozempic or Wegovy .

## 2023-12-21 RX ORDER — LOSARTAN POTASSIUM 100 MG/1
100 TABLET ORAL DAILY
Qty: 90 TABLET | Refills: 0 | Status: SHIPPED | OUTPATIENT
Start: 2023-12-21

## 2023-12-21 NOTE — TELEPHONE ENCOUNTER
Rx Refill Note  Requested Prescriptions     Pending Prescriptions Disp Refills    losartan (COZAAR) 100 MG tablet [Pharmacy Med Name: LOSARTAN 100MG TABLETS] 90 tablet 0     Sig: TAKE 1 TABLET BY MOUTH DAILY      Last office visit with prescribing clinician: 10/16/2023   Next office visit with prescribing clinician: 4/16/2024   Taylor Naqvi MA  12/21/23, 10:56 EST

## 2024-01-19 ENCOUNTER — PRIOR AUTHORIZATION (OUTPATIENT)
Dept: FAMILY MEDICINE CLINIC | Facility: CLINIC | Age: 79
End: 2024-01-19
Payer: MEDICARE

## 2024-01-19 NOTE — TELEPHONE ENCOUNTER
(Key: [id number]) - [id number]  Ozempic (2 MG/DOSE) 8MG/3ML pen-injectors  Status: PA Request  Created: January 18th, 2024 [phone]  Sent: January 19th, 2024    Message from Plan  Denied. This drug is used for weight loss purposes, which is one of the classes not covered under the Part D coverage. We do not offer supplemental benefit that would cover this drug. Section 1927(d)(2) of the Social Security Act (the Act) permits the exclusion of certain drugs or classes of drugs from coverage under Part D.

## 2024-01-24 PROBLEM — C43.61 MALIGNANT MELANOMA OF RIGHT UPPER EXTREMITY INCLUDING SHOULDER: Status: ACTIVE | Noted: 2024-01-24

## 2024-03-07 ENCOUNTER — OFFICE VISIT (OUTPATIENT)
Dept: FAMILY MEDICINE CLINIC | Facility: CLINIC | Age: 79
End: 2024-03-07
Payer: MEDICARE

## 2024-03-07 VITALS
OXYGEN SATURATION: 95 % | WEIGHT: 241 LBS | DIASTOLIC BLOOD PRESSURE: 72 MMHG | HEART RATE: 65 BPM | SYSTOLIC BLOOD PRESSURE: 130 MMHG | BODY MASS INDEX: 33.63 KG/M2

## 2024-03-07 DIAGNOSIS — I25.10 CORONARY ARTERY DISEASE INVOLVING NATIVE CORONARY ARTERY OF NATIVE HEART WITHOUT ANGINA PECTORIS: Primary | ICD-10-CM

## 2024-03-07 DIAGNOSIS — I10 HYPERTENSION, UNSPECIFIED TYPE: ICD-10-CM

## 2024-03-07 DIAGNOSIS — G47.9 SLEEP DISORDER: ICD-10-CM

## 2024-03-07 PROCEDURE — 3078F DIAST BP <80 MM HG: CPT | Performed by: PHYSICIAN ASSISTANT

## 2024-03-07 PROCEDURE — 3075F SYST BP GE 130 - 139MM HG: CPT | Performed by: PHYSICIAN ASSISTANT

## 2024-03-07 PROCEDURE — 99214 OFFICE O/P EST MOD 30 MIN: CPT | Performed by: PHYSICIAN ASSISTANT

## 2024-03-07 RX ORDER — SEMAGLUTIDE 2.68 MG/ML
2 INJECTION, SOLUTION SUBCUTANEOUS WEEKLY
Qty: 3 ML | Refills: 0 | Status: SHIPPED | OUTPATIENT
Start: 2024-03-07

## 2024-03-07 RX ORDER — SEMAGLUTIDE 0.68 MG/ML
0.5 INJECTION, SOLUTION SUBCUTANEOUS WEEKLY
Qty: 3 ML | Refills: 0 | Status: SHIPPED | OUTPATIENT
Start: 2024-03-07

## 2024-03-07 RX ORDER — SEMAGLUTIDE 1.34 MG/ML
1 INJECTION, SOLUTION SUBCUTANEOUS WEEKLY
Qty: 3 ML | Refills: 0 | Status: SHIPPED | OUTPATIENT
Start: 2024-03-07

## 2024-03-07 NOTE — PROGRESS NOTES
Chief Complaint   Patient presents with    Hypertension       HPI     Shaka Gurrola is a 78 y.o. male who is here for follow-up of hypertension, chronic conditions, and hyperlipidemia .     Recently returned from 1-month trip to [place] last Friday. Still waking up at 3 AM. Tylenol PM makes him too groggy.    Malignant melanoma excision right shoulder last fall. Margins and sentinel lymph node were clear.    Was off Ozempic for 1 month while on his trip but he resumed his shot last week when he returned. Denies n/v/d, constipation, abdominal pain.   He has been able to tolerate low dose rosuvastatin every other day without recurrent myalgias. No fasting today.   Does not monitor blood pressure at home. Compliant on losartan.       Past Medical History:   Diagnosis Date    Atrophic testicle     Left    BPH with elevated PSA     Hiatal hernia with gastroesophageal reflux     Hx of iron deficiency anemia     Inguinal hernia     Right       Past Surgical History:   Procedure Laterality Date    ANKLE SURGERY Left     Plate    CORONARY ANGIOPLASTY WITH STENT PLACEMENT      KNEE ARTHROSCOPY Right     PROSTATE BIOPSY      TOTAL HIP ARTHROPLASTY Right 01/14/2019    Dr. Fernandez    TOTAL HIP ARTHROPLASTY REVISION Left        Family History   Problem Relation Age of Onset    Aneurysm Mother     Lung cancer Father        Social History     Socioeconomic History    Marital status:    Tobacco Use    Smoking status: Never    Smokeless tobacco: Never   Vaping Use    Vaping status: Never Used   Substance and Sexual Activity    Alcohol use: Yes     Alcohol/week: 17.0 standard drinks of alcohol     Types: 7 Glasses of wine, 10 Shots of liquor per week    Drug use: No    Sexual activity: Yes     Partners: Female       Allergies   Allergen Reactions    Amlodipine Other (See Comments)     Leg swelling, shortness of breath       ROS    Review of Systems   Eyes:  Negative for blurred vision.   Respiratory:  Negative for  shortness of breath.    Cardiovascular:  Negative for chest pain.   Gastrointestinal:  Negative for abdominal pain, constipation, diarrhea, nausea and vomiting.   Neurological:  Negative for dizziness and headache.       Vitals:    03/07/24 1030   BP: 130/72   Pulse: 65   SpO2: 95%     Body mass index is 33.63 kg/m².      Current Outpatient Medications:     aspirin 81 MG EC tablet, Take 1 tablet by mouth Daily., Disp: , Rfl:     esomeprazole (nexIUM) 40 MG capsule, Take 1 capsule by mouth Every Morning Before Breakfast., Disp: , Rfl:     losartan (COZAAR) 100 MG tablet, TAKE 1 TABLET BY MOUTH DAILY, Disp: 90 tablet, Rfl: 0    rosuvastatin (CRESTOR) 5 MG tablet, Take 1 tablet by mouth Every Other Day., Disp: 45 tablet, Rfl: 3    tadalafil (CIALIS) 5 MG tablet, Take 1 tablet by mouth Daily., Disp: 90 tablet, Rfl: 1    Semaglutide, 1 MG/DOSE, (Ozempic, 1 MG/DOSE,) 4 MG/3ML solution pen-injector, Inject 1 mg under the skin into the appropriate area as directed 1 (One) Time Per Week., Disp: 3 mL, Rfl: 0    Semaglutide, 2 MG/DOSE, (Ozempic, 2 MG/DOSE,) 8 MG/3ML solution pen-injector, Inject 2 mg under the skin into the appropriate area as directed 1 (One) Time Per Week., Disp: 3 mL, Rfl: 0    Semaglutide,0.25 or 0.5MG/DOS, (Ozempic, 0.25 or 0.5 MG/DOSE,) 2 MG/3ML solution pen-injector, Inject 0.5 mg under the skin into the appropriate area as directed 1 (One) Time Per Week. Then start 1 mg weekly., Disp: 3 mL, Rfl: 0    PE    Physical Exam  Vitals reviewed.   Constitutional:       General: He is not in acute distress.     Appearance: He is well-developed. He is obese.   HENT:      Head: Normocephalic and atraumatic.   Eyes:      Conjunctiva/sclera: Conjunctivae normal.   Cardiovascular:      Rate and Rhythm: Normal rate and regular rhythm.      Heart sounds: Normal heart sounds. No murmur heard.  Pulmonary:      Effort: Pulmonary effort is normal.      Breath sounds: Normal breath sounds.   Musculoskeletal:      Cervical  back: Normal range of motion.   Skin:     General: Skin is warm and dry.   Neurological:      Mental Status: He is alert.      Gait: Gait normal.   Psychiatric:         Speech: Speech normal.         Behavior: Behavior normal.         Results    Results for orders placed or performed in visit on 10/16/23   POC Glycosylated Hemoglobin (Hb A1C)    Specimen: Blood   Result Value Ref Range    Hemoglobin A1C 5.1 4.5 - 5.7 %    Lot Number [id number]     Expiration Date 05/07/2025        A/P    Problem List Items Addressed This Visit          Cardiac and Vasculature    Hypertension    Current Assessment & Plan     Adequate control. Continue losartan.          Coronary artery disease - Primary    Overview     2013: stents x2  Followed by [facility] cardiology, CORY Avalos         Current Assessment & Plan     Presently asymptomatic.   LDL 84 in March. Now on rosuvastatin.   He will return to the lab fasting for lipid profile, cmp.   Discussed decreasing his Ozempic dose to 0.5 mg and retitrating due to lapse in therapy on his trip. This medication has been helpful for appetite suppression, weight reduction. His insurance denied it. He has been paying out of pocket. We will see if his insurance will cover it for CV risk reduction due to his history of atherosclerotic coronary vascular disease.   RTC in 3 months for weight check, f/u Ozempic.          Relevant Medications    Semaglutide,0.25 or 0.5MG/DOS, (Ozempic, 0.25 or 0.5 MG/DOSE,) 2 MG/3ML solution pen-injector    Semaglutide, 1 MG/DOSE, (Ozempic, 1 MG/DOSE,) 4 MG/3ML solution pen-injector    Semaglutide, 2 MG/DOSE, (Ozempic, 2 MG/DOSE,) 8 MG/3ML solution pen-injector    Other Relevant Orders    Comprehensive Metabolic Panel    Lipid Panel     Other Visit Diagnoses       Sleep disorder        Trial melatonin 3-6 mg OTC. If symptoms persist, advised patient to send me a message. Will trial trazodone if needed.            Plan of care was reviewed with patient at  the conclusion of today's visit. Education was provided regarding diagnoses, management, and the importance of keeping follow-up appointments. The patient was counseled regarding the risks, benefits, and possible side-effects of treatment. Patient and/or family express understanding and agreement with the management plan.        Carlos Enrique Jamison PA-C

## 2024-03-07 NOTE — ASSESSMENT & PLAN NOTE
Presently asymptomatic.   LDL 84 in March. Now on rosuvastatin.   He will return to the lab fasting for lipid profile, cmp.   Discussed decreasing his Ozempic dose to 0.5 mg and retitrating due to lapse in therapy on his trip. This medication has been helpful for appetite suppression, weight reduction. His insurance denied it. He has been paying out of pocket. We will see if his insurance will cover it for CV risk reduction due to his history of atherosclerotic coronary vascular disease.   RTC in 3 months for weight check, f/u Ozempic.

## 2024-03-20 RX ORDER — LOSARTAN POTASSIUM 100 MG/1
100 TABLET ORAL DAILY
Qty: 90 TABLET | Refills: 0 | Status: SHIPPED | OUTPATIENT
Start: 2024-03-20

## 2024-04-26 PROBLEM — Z85.820 HISTORY OF MALIGNANT MELANOMA OF SKIN: Status: ACTIVE | Noted: 2024-01-24

## 2024-05-03 ENCOUNTER — PRIOR AUTHORIZATION (OUTPATIENT)
Dept: FAMILY MEDICINE CLINIC | Facility: CLINIC | Age: 79
End: 2024-05-03
Payer: MEDICARE

## 2024-05-03 NOTE — TELEPHONE ENCOUNTER
"(Key: [id number]) - [id number]  Ozempic (1 MG/DOSE) 4MG/3ML pen-injectors  Status: PA Request  Created: May 3rd, 2024 [phone]  Sent: May 3rd, 2024    Message from Plan  Denied. This drug used for ASHD NATIVE CORONARY ARTERY W/O ANGINA PECTORIS is not an approved use. Medicare Part D rules states the drug must be used for a \"medically-accepted indication\". A \"medically-accepted indication\" means a use that is approved by the Food and Drug Administration (FDA), or a use supported by specific resources. These are the [facility] Form[facility] Drug Information and DRUGDEX Information System. Therefore, this drug cannot be covered under your Medicare Part D benefit.    (Key: [id number]) - [id number]  Ozempic (2 MG/DOSE) 8MG/3ML pen-injectors  Status: PA Request  Created: May 3rd, 2024 [phone]  Sent: May 3rd, 2024    Message from Plan  Denied. This drug used for ASHD NATIVE CORONARY ARTERY W/O ANGINA PECTORIS is not an approved use. Medicare Part D rules states the drug must be used for a \"medically-accepted indication\". A \"medically-accepted indication\" means a use that is approved by the Food and Drug Administration (FDA), or a use supported by specific resources. These are the [facility] Formulary [facility] Drug Information and DRUGDEX Information System. Therefore, this drug cannot be covered under your Medicare Part D benefit.      Fax: [phone]  Plan Website: [email or web address]  Address: [facility]   Attn: Medicare Pharmacy Appeals   [address] [address]   [place] [address]  " 4/30/19 breast bx benign. I let patient know.     Message to Mariana, need to know what shape breast biopsy marker was used.     lml

## 2024-05-22 ENCOUNTER — LAB (OUTPATIENT)
Dept: LAB | Facility: HOSPITAL | Age: 79
End: 2024-05-22
Payer: MEDICARE

## 2024-05-22 DIAGNOSIS — I25.10 CORONARY ARTERY DISEASE INVOLVING NATIVE CORONARY ARTERY OF NATIVE HEART WITHOUT ANGINA PECTORIS: ICD-10-CM

## 2024-05-22 LAB
ALBUMIN SERPL-MCNC: 4.2 G/DL (ref 3.5–5.2)
ALBUMIN/GLOB SERPL: 1.4 G/DL
ALP SERPL-CCNC: 68 U/L (ref 39–117)
ALT SERPL W P-5'-P-CCNC: 8 U/L (ref 1–41)
ANION GAP SERPL CALCULATED.3IONS-SCNC: 10 MMOL/L (ref 5–15)
AST SERPL-CCNC: 15 U/L (ref 1–40)
BILIRUB SERPL-MCNC: 0.5 MG/DL (ref 0–1.2)
BUN SERPL-MCNC: 17 MG/DL (ref 8–23)
BUN/CREAT SERPL: 14.9 (ref 7–25)
CALCIUM SPEC-SCNC: 9.4 MG/DL (ref 8.6–10.5)
CHLORIDE SERPL-SCNC: 104 MMOL/L (ref 98–107)
CHOLEST SERPL-MCNC: 116 MG/DL (ref 0–200)
CO2 SERPL-SCNC: 23 MMOL/L (ref 22–29)
CREAT SERPL-MCNC: 1.14 MG/DL (ref 0.76–1.27)
EGFRCR SERPLBLD CKD-EPI 2021: 66.7 ML/MIN/1.73
GLOBULIN UR ELPH-MCNC: 3.1 GM/DL
GLUCOSE SERPL-MCNC: 90 MG/DL (ref 65–99)
HDLC SERPL-MCNC: 65 MG/DL (ref 40–60)
LDLC SERPL CALC-MCNC: 39 MG/DL (ref 0–100)
LDLC/HDLC SERPL: 0.62 {RATIO}
POTASSIUM SERPL-SCNC: 4.6 MMOL/L (ref 3.5–5.2)
PROT SERPL-MCNC: 7.3 G/DL (ref 6–8.5)
SODIUM SERPL-SCNC: 137 MMOL/L (ref 136–145)
TRIGL SERPL-MCNC: 53 MG/DL (ref 0–150)
VLDLC SERPL-MCNC: 12 MG/DL (ref 5–40)

## 2024-05-22 PROCEDURE — 80061 LIPID PANEL: CPT

## 2024-05-22 PROCEDURE — 80053 COMPREHEN METABOLIC PANEL: CPT

## 2024-05-24 RX ORDER — ROSUVASTATIN CALCIUM 5 MG/1
5 TABLET, COATED ORAL EVERY OTHER DAY
Qty: 45 TABLET | Refills: 0 | Status: SHIPPED | OUTPATIENT
Start: 2024-05-24

## 2024-06-07 ENCOUNTER — OFFICE VISIT (OUTPATIENT)
Dept: FAMILY MEDICINE CLINIC | Facility: CLINIC | Age: 79
End: 2024-06-07
Payer: MEDICARE

## 2024-06-07 VITALS
BODY MASS INDEX: 33.38 KG/M2 | HEIGHT: 71 IN | HEART RATE: 61 BPM | DIASTOLIC BLOOD PRESSURE: 74 MMHG | WEIGHT: 238.4 LBS | OXYGEN SATURATION: 95 % | SYSTOLIC BLOOD PRESSURE: 130 MMHG

## 2024-06-07 DIAGNOSIS — E66.09 CLASS 1 OBESITY DUE TO EXCESS CALORIES WITH SERIOUS COMORBIDITY AND BODY MASS INDEX (BMI) OF 33.0 TO 33.9 IN ADULT: Primary | ICD-10-CM

## 2024-06-07 DIAGNOSIS — M19.031 PRIMARY OSTEOARTHRITIS OF RIGHT WRIST: ICD-10-CM

## 2024-06-07 DIAGNOSIS — K21.9 GASTROESOPHAGEAL REFLUX DISEASE, UNSPECIFIED WHETHER ESOPHAGITIS PRESENT: ICD-10-CM

## 2024-06-07 PROCEDURE — 1125F AMNT PAIN NOTED PAIN PRSNT: CPT | Performed by: PHYSICIAN ASSISTANT

## 2024-06-07 PROCEDURE — 99214 OFFICE O/P EST MOD 30 MIN: CPT | Performed by: PHYSICIAN ASSISTANT

## 2024-06-07 PROCEDURE — 3075F SYST BP GE 130 - 139MM HG: CPT | Performed by: PHYSICIAN ASSISTANT

## 2024-06-07 PROCEDURE — 3078F DIAST BP <80 MM HG: CPT | Performed by: PHYSICIAN ASSISTANT

## 2024-06-07 RX ORDER — FAMOTIDINE 20 MG/1
20 TABLET, FILM COATED ORAL 2 TIMES DAILY PRN
Qty: 60 TABLET | Refills: 5 | Status: SHIPPED | OUTPATIENT
Start: 2024-06-07

## 2024-06-07 NOTE — PROGRESS NOTES
Chief Complaint   Patient presents with    Med Refill     3 mo f/u for ozempic   Pain in thumb (gradual arthritis)       HPI     Shaka Gurrola is a 78 y.o. male who is here for 3 month follow-up of obesity and coronary artery disease.     He increased to semaglutide to 2 mg weekly just 2 weeks ago. He does note appetite suppression on this medication but has not noticed dramatic weight reduction yet. He has lost 3 pounds. He is tolerating the medication well. Some GERD for which he uses Tums 4-5 days/week. Medication is costing about $800 out of pocket.     He reports pain at the base of his right thumb which he feels is arthritis.     Past Medical History:   Diagnosis Date    Atrophic testicle     Left    BPH with elevated PSA     Hiatal hernia with gastroesophageal reflux     Hx of iron deficiency anemia     Inguinal hernia     Right       Past Surgical History:   Procedure Laterality Date    ANKLE SURGERY Left     Plate    CORONARY ANGIOPLASTY WITH STENT PLACEMENT      KNEE ARTHROSCOPY Right     PROSTATE BIOPSY      TOTAL HIP ARTHROPLASTY Right 01/14/2019    Dr. Fernandez    TOTAL HIP ARTHROPLASTY REVISION Left        Family History   Problem Relation Age of Onset    Aneurysm Mother     Lung cancer Father        Social History     Socioeconomic History    Marital status:    Tobacco Use    Smoking status: Never    Smokeless tobacco: Never   Vaping Use    Vaping status: Never Used   Substance and Sexual Activity    Alcohol use: Yes     Alcohol/week: 17.0 standard drinks of alcohol     Types: 7 Glasses of wine, 10 Shots of liquor per week    Drug use: No    Sexual activity: Yes     Partners: Female       Allergies   Allergen Reactions    Amlodipine Other (See Comments)     Leg swelling, shortness of breath       ROS    Review of Systems   Gastrointestinal:  Positive for GERD. Negative for abdominal pain, constipation, diarrhea, nausea and vomiting.       Vitals:    06/07/24 0847   BP: 130/74   Pulse: 61    SpO2: 95%     Body mass index is 33.27 kg/m².      Current Outpatient Medications:     aspirin 81 MG EC tablet, Take 1 tablet by mouth Daily., Disp: , Rfl:     esomeprazole (nexIUM) 40 MG capsule, Take 1 capsule by mouth Every Morning Before Breakfast., Disp: , Rfl:     losartan (COZAAR) 100 MG tablet, TAKE 1 TABLET BY MOUTH DAILY, Disp: 90 tablet, Rfl: 0    Papaverine-Phentolamine 30-1 MG/ML solution, Inject as directed, Disp: , Rfl:     rosuvastatin (CRESTOR) 5 MG tablet, Take 1 tablet by mouth Every Other Day., Disp: 45 tablet, Rfl: 0    Semaglutide, 2 MG/DOSE, (Ozempic, 2 MG/DOSE,) 8 MG/3ML solution pen-injector, Inject 2 mg under the skin into the appropriate area as directed 1 (One) Time Per Week., Disp: 3 mL, Rfl: 0    tadalafil (CIALIS) 5 MG tablet, Take 1 tablet by mouth Daily., Disp: 90 tablet, Rfl: 1    Diclofenac Sodium (VOLTAREN) 1 % gel gel, Apply 4 g topically to the appropriate area as directed 4 (Four) Times a Day As Needed (hand arthritis)., Disp: 100 g, Rfl: 2    famotidine (Pepcid) 20 MG tablet, Take 1 tablet by mouth 2 (Two) Times a Day As Needed for Heartburn., Disp: 60 tablet, Rfl: 5    PE    Physical Exam  Vitals reviewed.   Constitutional:       General: He is not in acute distress.     Appearance: He is well-developed. He is obese.   HENT:      Head: Normocephalic and atraumatic.   Eyes:      Conjunctiva/sclera: Conjunctivae normal.   Cardiovascular:      Rate and Rhythm: Normal rate and regular rhythm.      Heart sounds: Normal heart sounds. No murmur heard.  Pulmonary:      Effort: Pulmonary effort is normal.      Breath sounds: Normal breath sounds.   Musculoskeletal:      Cervical back: Normal range of motion.   Skin:     General: Skin is warm and dry.   Neurological:      Mental Status: He is alert.      Gait: Gait normal.   Psychiatric:         Speech: Speech normal.         Behavior: Behavior normal.         Results    Results for orders placed or performed in visit on 05/22/24    Comprehensive Metabolic Panel    Specimen: Blood   Result Value Ref Range    Glucose 90 65 - 99 mg/dL    BUN 17 8 - 23 mg/dL    Creatinine 1.14 0.76 - 1.27 mg/dL    Sodium 137 136 - 145 mmol/L    Potassium 4.6 3.5 - 5.2 mmol/L    Chloride 104 98 - 107 mmol/L    CO2 23.0 22.0 - 29.0 mmol/L    Calcium 9.4 8.6 - 10.5 mg/dL    Total Protein 7.3 6.0 - 8.5 g/dL    Albumin 4.2 3.5 - 5.2 g/dL    ALT (SGPT) 8 1 - 41 U/L    AST (SGOT) 15 1 - 40 U/L    Alkaline Phosphatase 68 39 - 117 U/L    Total Bilirubin 0.5 0.0 - 1.2 mg/dL    Globulin 3.1 gm/dL    A/G Ratio 1.4 g/dL    BUN/Creatinine Ratio 14.9 7.0 - 25.0    Anion Gap 10.0 5.0 - 15.0 mmol/L    eGFR 66.7 >60.0 mL/min/1.73   Lipid Panel    Specimen: Blood   Result Value Ref Range    Total Cholesterol 116 0 - 200 mg/dL    Triglycerides 53 0 - 150 mg/dL    HDL Cholesterol 65 (H) 40 - 60 mg/dL    LDL Cholesterol  39 0 - 100 mg/dL    VLDL Cholesterol 12 5 - 40 mg/dL    LDL/HDL Ratio 0.62        A/P    Problem List Items Addressed This Visit          Endocrine and Metabolic    Class 1 obesity due to excess calories with serious comorbidity and body mass index (BMI) of 33.0 to 33.9 in adult - Primary     Other Visit Diagnoses       Gastroesophageal reflux disease, unspecified whether esophagitis present        Relevant Medications    famotidine (Pepcid) 20 MG tablet    Primary osteoarthritis of right wrist                -Patient is being treated with semaglutide for obesity. He would like to transition to compounded medication for cost after discussion. He agrees to sign compounded medication waiver today. We will fax his prescription to [facility].   -Encouraged low carb diet and routine exercise.   -Start pepcid prn for GERD. We also discussed reflux precautions.   -Trial voltaren gel for right hand 1st CMC joint pain.   -RTC n 3 months for f/u of obesity, semaglutide.     Plan of care was reviewed with patient at the conclusion of today's visit. Education  was provided regarding diagnoses, management, and the importance of keeping follow-up appointments. The patient was counseled regarding the risks, benefits, and possible side-effects of treatment. Patient and/or family express understanding and agreement with the management plan.        Carlos Enrique Jamison PA-C

## 2024-06-25 RX ORDER — LOSARTAN POTASSIUM 100 MG/1
100 TABLET ORAL DAILY
Qty: 90 TABLET | Refills: 0 | Status: SHIPPED | OUTPATIENT
Start: 2024-06-25

## 2024-06-25 NOTE — TELEPHONE ENCOUNTER
Rx Refill Note  Requested Prescriptions     Pending Prescriptions Disp Refills    losartan (COZAAR) 100 MG tablet [Pharmacy Med Name: LOSARTAN 100MG TABLETS] 90 tablet 0     Sig: TAKE 1 TABLET BY MOUTH DAILY      Last office visit with prescribing clinician: 6/7/2024   Last telemedicine visit with prescribing clinician: Visit date not found   Next office visit with prescribing clinician: Visit date not found                         Would you like a call back once the refill request has been completed: [] Yes [] No    If the office needs to give you a call back, can they leave a voicemail: [] Yes [] No    Ambar Chaney MA  06/25/24, 15:29 EDT

## 2024-07-03 DIAGNOSIS — I25.10 CORONARY ARTERY DISEASE INVOLVING NATIVE CORONARY ARTERY OF NATIVE HEART WITHOUT ANGINA PECTORIS: ICD-10-CM

## 2024-07-03 RX ORDER — SEMAGLUTIDE 2.68 MG/ML
2 INJECTION, SOLUTION SUBCUTANEOUS WEEKLY
Qty: 3 ML | Refills: 0 | Status: CANCELLED | OUTPATIENT
Start: 2024-07-03

## 2024-07-03 NOTE — TELEPHONE ENCOUNTER
"  Caller: Shaka Gurrola \"Bill\"    Relationship: Self    Best call back number: [phone]     Requested Prescriptions:   Requested Prescriptions     Pending Prescriptions Disp Refills    Semaglutide, 2 MG/DOSE, (Ozempic, 2 MG/DOSE,) 8 MG/3ML solution pen-injector 3 mL 0     Sig: Inject 2 mg under the skin into the appropriate area as directed 1 (One) Time Per Week.        [facility] be sent[facility] [place] [address] [facility] [facility] [phone] [facility] [phone] FX     Last office visit with prescribing clinician: 6/7/2024   Last telemedicine visit with prescribing clinician: Visit date not found   Next office visit with prescribing clinician: Visit date not found     Does the patient have less than a 3 day supply:  [x] Yes  [] No    Would you like a call back once the refill request has been completed: [] Yes [x] No    If the office needs to give you a call back, can they leave a voicemail: [] Yes [x] No    Ibis Cortes Rep   07/03/24 13:02 EDT   "

## 2024-10-02 RX ORDER — LOSARTAN POTASSIUM 100 MG/1
100 TABLET ORAL DAILY
Qty: 90 TABLET | Refills: 0 | Status: SHIPPED | OUTPATIENT
Start: 2024-10-02

## 2024-10-16 DIAGNOSIS — I25.10 CORONARY ARTERY DISEASE INVOLVING NATIVE CORONARY ARTERY OF NATIVE HEART WITHOUT ANGINA PECTORIS: ICD-10-CM

## 2024-10-16 NOTE — TELEPHONE ENCOUNTER
"Caller: Shaka Gurrola \"Bill\"    Relationship: Self    Best call back number: [phone]     Requested Prescriptions:   Requested Prescriptions     Pending Prescriptions Disp Refills    Semaglutide, 2 MG/DOSE, (Ozempic, 2 MG/DOSE,) 8 MG/3ML solution pen-injector 3 mL 0     Sig: Inject 2 mg under the skin into the appropriate area as directed 1 (One) Time Per Week.        Pharmacy where request should be sent: [place] [address] [facility] [address] - [phone] [facility] [phone] FX     Last office visit with prescribing clinician: 6/7/2024   Last telemedicine visit with prescribing clinician: Visit date not found   Next office visit with prescribing clinician: Visit date not found     Additional details provided by patient: NO MEDICATION ON HAND    Does the patient have less than a 3 day supply:  [x] Yes  [] No    Would you like a call back once the refill request has been completed: [x] Yes [] No    If the office needs to give you a call back, can they leave a voicemail: [] Yes [] No    Ibis Landin Rep   10/16/24 11:04 EDT         "

## 2024-10-16 NOTE — TELEPHONE ENCOUNTER
..Attempted to contact patient no answer left vm      HUB MAY RELAY MESSAGE AND DOCUMENT    When was patient's last dose of semaglutide?

## 2024-10-17 NOTE — TELEPHONE ENCOUNTER
"Name: Shaka Gurrola \"Bill\"      Relationship: Self      Best Callback Number: [phone]       HUB PROVIDED THE RELAY MESSAGE FROM THE OFFICE      PATIENT: VOICED UNDERSTANDING AND HAS NO FURTHER QUESTIONS AT THIS TIME    ADDITIONAL INFORMATION:  LAST DOES WAS 10 DAYS AGO  "

## 2024-10-18 RX ORDER — SEMAGLUTIDE 2.68 MG/ML
2 INJECTION, SOLUTION SUBCUTANEOUS WEEKLY
Qty: 3 ML | Refills: 1
Start: 2024-10-18

## 2024-10-18 NOTE — TELEPHONE ENCOUNTER
Gave new prescription to Gisel to fax to [facility] this morning. Please remind patient schedule an appointment for his wellness visit. We can follow-up on his semaglutide at that time as well.

## 2024-11-21 RX ORDER — ROSUVASTATIN CALCIUM 5 MG/1
5 TABLET, COATED ORAL EVERY OTHER DAY
Qty: 45 TABLET | Refills: 0 | Status: SHIPPED | OUTPATIENT
Start: 2024-11-21

## 2024-11-21 RX ORDER — FAMOTIDINE 20 MG/1
20 TABLET, FILM COATED ORAL 2 TIMES DAILY PRN
Qty: 180 TABLET | Refills: 0 | Status: SHIPPED | OUTPATIENT
Start: 2024-11-21

## 2024-11-21 NOTE — TELEPHONE ENCOUNTER
"Caller: Shaka Gurrola \"Bill\"    Relationship: Self    Best call back number:       [phone] (Mobile)     Requested Prescriptions:   Requested Prescriptions     Pending Prescriptions Disp Refills    rosuvastatin (CRESTOR) 5 MG tablet 45 tablet 0     Sig: Take 1 tablet by mouth Every Other Day.      PATIENT STATED DOSAGE NEEDS TO BE (10) MG INSTEAD OF (5) MG      famotidine (Pepcid) 20 MG tablet     Pharmacy where request should be sent:     [id number] - [address] AT [facility] & [address] - [phone]  - [phone] FX     Last office visit with prescribing clinician: 6/7/2024   Last telemedicine visit with prescribing clinician: Visit date not found   Next office visit with prescribing clinician: Visit date not found     Additional details provided by patient:     PATIENT STATED HE HAS AT LEAST (3) DAY SUPPLY OF BOTH MEDICATIONS    PATIENT ALSO STATED HE WILL BE LEAVING  MONDAY, 11/25 FOR [facility], AND HE WILL NOT RETURN UNTIL 12/21    Does the patient have less than a 3 day supply:  [] Yes  [] No    Would you like a call back once the refill request has been completed: [] Yes [] No    If the office needs to give you a call back, can they leave a voicemail: [] Yes [] No    Ibis Burgos Rep   11/21/24 10:38 EST         "

## 2024-11-21 NOTE — TELEPHONE ENCOUNTER
Rx Refill Note  Requested Prescriptions     Pending Prescriptions Disp Refills    rosuvastatin (CRESTOR) 5 MG tablet 45 tablet 0     Sig: Take 1 tablet by mouth Every Other Day.      Last office visit with prescribing clinician: 6/7/2024   Last telemedicine visit with prescribing clinician: Visit date not found   Next office visit with prescribing clinician: Visit date not found                         Would you like a call back once the refill request has been completed: [] Yes [] No    If the office needs to give you a call back, can they leave a voicemail: [] Yes [] No    Ambar Chaney MA  11/21/24, 12:38 EST

## 2024-12-26 RX ORDER — LOSARTAN POTASSIUM 100 MG/1
100 TABLET ORAL DAILY
Qty: 90 TABLET | Refills: 0 | Status: SHIPPED | OUTPATIENT
Start: 2024-12-26

## 2024-12-26 NOTE — TELEPHONE ENCOUNTER
Rx Refill Note  Requested Prescriptions     Pending Prescriptions Disp Refills    losartan (COZAAR) 100 MG tablet [Pharmacy Med Name: LOSARTAN 100MG TABLETS] 90 tablet 0     Sig: TAKE 1 TABLET BY MOUTH DAILY      Last office visit with prescribing clinician: 6/7/2024   Last telemedicine visit with prescribing clinician: Visit date not found   Next office visit with prescribing clinician: Visit date not found                         Would you like a call back once the refill request has been completed: [] Yes [] No    If the office needs to give you a call back, can they leave a voicemail: [] Yes [] No    Ambar Chaney MA  12/26/24, 09:57 EST

## 2025-01-03 RX ORDER — LOSARTAN POTASSIUM 100 MG/1
100 TABLET ORAL DAILY
Qty: 90 TABLET | Refills: 0 | Status: SHIPPED | OUTPATIENT
Start: 2025-01-03

## 2025-01-03 RX ORDER — LOSARTAN POTASSIUM 100 MG/1
100 TABLET ORAL DAILY
Qty: 90 TABLET | Refills: 0 | OUTPATIENT
Start: 2025-01-03

## 2025-01-03 NOTE — TELEPHONE ENCOUNTER
"Caller: Shaka Gurrola \"Bill\"    Relationship: Self    Best call back number: [phone]     Requested Prescriptions:   Requested Prescriptions     Pending Prescriptions Disp Refills    losartan (COZAAR) 100 MG tablet 90 tablet 0     Sig: Take 1 tablet by mouth Daily.        Pharmacy where request should be sent: [id number] - [address] AT [facility] & [facility] [address] - [phone]  - [phone] FX     Last office visit with prescribing clinician: 6/7/2024   Last telemedicine visit with prescribing clinician: Visit date not found   Next office visit with prescribing clinician: Visit date not found     Additional details provided by patient:PHARMACY TOLD PATIENT THEY NEVER RECEIVED THE SCRIPT - PLEASE RESEND -  PATIENT HAS 4 OR 5 DAY LEFT     Does the patient have less than a 3 day supply:  [] Yes  [x] No    Would you like a call back once the refill request has been completed: [] Yes [x] No    If the office needs to give you a call back, can they leave a voicemail: [] Yes [x] No    Fiordaliza Holt MA   01/03/25 15:47 EST         "

## 2025-01-09 DIAGNOSIS — I25.10 CORONARY ARTERY DISEASE INVOLVING NATIVE CORONARY ARTERY OF NATIVE HEART WITHOUT ANGINA PECTORIS: ICD-10-CM

## 2025-01-09 RX ORDER — SEMAGLUTIDE 2.68 MG/ML
2 INJECTION, SOLUTION SUBCUTANEOUS WEEKLY
Qty: 3 ML | Refills: 0 | Status: SHIPPED | OUTPATIENT
Start: 2025-01-09

## 2025-01-09 NOTE — TELEPHONE ENCOUNTER
"     Caller: Shaka Gurrola \"Bill\"    Relationship: Self    Best call back number: [phone]    Requested Prescriptions:   Requested Prescriptions     Pending Prescriptions Disp Refills    Semaglutide, 2 MG/DOSE, (Ozempic, 2 MG/DOSE,) 8 MG/3ML solution pen-injector       Sig: Inject 2 mg under the skin into the appropriate area as directed 1 (One) Time Per Week.        [facility] be sent[facility] [place] [address] [facility] [facility] - [phone] [facility] [phone] FX     Last office visit with prescribing clinician: 6/7/2024   Last telemedicine visit with prescribing clinician: Visit date not found   Next office visit with prescribing clinician: Visit date not found     Additional details provided by patient: PT IS ALMOST OUT    Does the patient have less than a 3 day supply:  [x] Yes  [] No    Would you like a call back once the refill request has been completed: [x] Yes [] No    If the office needs to give you a call back, can they leave a voicemail: [x] Yes [] No    Ibis Urias Rep   01/09/25 15:15 EST          "

## 2025-02-06 RX ORDER — LOSARTAN POTASSIUM 100 MG/1
100 TABLET ORAL DAILY
Qty: 90 TABLET | Refills: 0 | Status: SHIPPED | OUTPATIENT
Start: 2025-02-06

## 2025-02-06 RX ORDER — ROSUVASTATIN CALCIUM 5 MG/1
5 TABLET, COATED ORAL EVERY OTHER DAY
Qty: 45 TABLET | Refills: 0 | Status: SHIPPED | OUTPATIENT
Start: 2025-02-06

## 2025-02-06 RX ORDER — FAMOTIDINE 20 MG/1
20 TABLET, FILM COATED ORAL 2 TIMES DAILY PRN
Qty: 180 TABLET | Refills: 0 | Status: SHIPPED | OUTPATIENT
Start: 2025-02-06

## 2025-02-19 ENCOUNTER — OFFICE VISIT (OUTPATIENT)
Dept: FAMILY MEDICINE CLINIC | Facility: CLINIC | Age: 80
End: 2025-02-19
Payer: MEDICARE

## 2025-02-19 VITALS
BODY MASS INDEX: 34.16 KG/M2 | OXYGEN SATURATION: 96 % | DIASTOLIC BLOOD PRESSURE: 78 MMHG | HEIGHT: 71 IN | SYSTOLIC BLOOD PRESSURE: 122 MMHG | HEART RATE: 66 BPM | WEIGHT: 244 LBS

## 2025-02-19 DIAGNOSIS — E66.811 CLASS 1 OBESITY DUE TO EXCESS CALORIES WITH SERIOUS COMORBIDITY AND BODY MASS INDEX (BMI) OF 34.0 TO 34.9 IN ADULT: Primary | ICD-10-CM

## 2025-02-19 DIAGNOSIS — E66.09 CLASS 1 OBESITY DUE TO EXCESS CALORIES WITH SERIOUS COMORBIDITY AND BODY MASS INDEX (BMI) OF 34.0 TO 34.9 IN ADULT: Primary | ICD-10-CM

## 2025-02-19 PROCEDURE — 1125F AMNT PAIN NOTED PAIN PRSNT: CPT | Performed by: PHYSICIAN ASSISTANT

## 2025-02-19 PROCEDURE — 99213 OFFICE O/P EST LOW 20 MIN: CPT | Performed by: PHYSICIAN ASSISTANT

## 2025-02-19 PROCEDURE — 3074F SYST BP LT 130 MM HG: CPT | Performed by: PHYSICIAN ASSISTANT

## 2025-02-19 PROCEDURE — 3078F DIAST BP <80 MM HG: CPT | Performed by: PHYSICIAN ASSISTANT

## 2025-02-19 NOTE — PROGRESS NOTES
Chief Complaint   Patient presents with    Weight Loss     Medication (refill on ozempic)        HPI     Shaka Gurrola is a 79 y.o. male who is here for follow-up of  obesity .     The patient was last seen in June.   He has been out of semaglutide for 2 few weeks after running out of medication and notes he has regained 5-6 pounds since stopping the medication. He was tolerating this well aside from mild GERD the first couple days after the injection. Taking nexium and pepcid which are helpful. Takes metamucil which helps constipation. The medication helps with appetite suppression. He has lost 18 pounds from his peak weight of 262 in March of 2021.  Usually has toast or cereal in morning, sandwich for lunch, and home cooked meal for supper. Avoids snacking. Eats out about 50% the time. He started exercising after [place]. He walks 1 mile on the treadmill and does light weights about 5 days per week.     Past Medical History:   Diagnosis Date    Atrophic testicle     Left    BPH with elevated PSA     Hiatal hernia with gastroesophageal reflux     Hx of iron deficiency anemia     Inguinal hernia     Right       Past Surgical History:   Procedure Laterality Date    ANKLE SURGERY Left     Plate    CORONARY ANGIOPLASTY WITH STENT PLACEMENT      KNEE ARTHROSCOPY Right     PROSTATE BIOPSY      TOTAL HIP ARTHROPLASTY Right 01/14/2019    Dr. Fernandez    TOTAL HIP ARTHROPLASTY REVISION Left        Family History   Problem Relation Age of Onset    Aneurysm Mother     Lung cancer Father        Social History     Socioeconomic History    Marital status:    Tobacco Use    Smoking status: Never    Smokeless tobacco: Never   Vaping Use    Vaping status: Never Used   Substance and Sexual Activity    Alcohol use: Yes     Alcohol/week: 17.0 standard drinks of alcohol     Types: 7 Glasses of wine, 10 Shots of liquor per week    Drug use: No    Sexual activity: Yes     Partners: Female       Allergies   Allergen Reactions     Amlodipine Other (See Comments)     Leg swelling, shortness of breath       ROS    Review of Systems   Respiratory:  Negative for shortness of breath.    Cardiovascular:  Negative for chest pain.   Gastrointestinal:  Positive for constipation and GERD. Negative for abdominal pain, nausea and vomiting.       Vitals:    02/19/25 1305   BP: 122/78   Pulse: 66   SpO2: 96%     Body mass index is 34.16 kg/m².      Current Outpatient Medications:     aspirin 81 MG EC tablet, Take 1 tablet by mouth Daily., Disp: , Rfl:     Diclofenac Sodium (VOLTAREN) 1 % gel gel, Apply 4 g topically to the appropriate area as directed 4 (Four) Times a Day As Needed (hand arthritis)., Disp: 100 g, Rfl: 2    esomeprazole (nexIUM) 40 MG capsule, Take 1 capsule by mouth Every Morning Before Breakfast., Disp: , Rfl:     famotidine (PEPCID) 20 MG tablet, TAKE 1 TABLET BY MOUTH TWICE DAILY AS NEEDED FOR HEARTBURN, Disp: 180 tablet, Rfl: 0    losartan (COZAAR) 100 MG tablet, TAKE 1 TABLET BY MOUTH DAILY, Disp: 90 tablet, Rfl: 0    Papaverine-Phentolamine 30-1 MG/ML solution, Inject as directed, Disp: , Rfl:     rosuvastatin (CRESTOR) 5 MG tablet, TAKE 1 TABLET BY MOUTH EVERY OTHER DAY, Disp: 45 tablet, Rfl: 0    Semaglutide, 2 MG/DOSE, (Ozempic, 2 MG/DOSE,) 8 MG/3ML solution pen-injector, Inject 2 mg under the skin into the appropriate area as directed 1 (One) Time Per Week., Disp: 3 mL, Rfl: 0    PE    Physical Exam  Vitals reviewed.   Constitutional:       General: He is not in acute distress.     Appearance: He is well-developed.   HENT:      Head: Normocephalic and atraumatic.   Eyes:      Conjunctiva/sclera: Conjunctivae normal.   Cardiovascular:      Rate and Rhythm: Normal rate and regular rhythm.      Heart sounds: Normal heart sounds. No murmur heard.  Pulmonary:      Effort: Pulmonary effort is normal.      Breath sounds: Normal breath sounds.   Musculoskeletal:      Cervical back: Normal range of motion.   Skin:     General: Skin is  warm and dry.   Neurological:      Mental Status: He is alert.      Gait: Gait normal.   Psychiatric:         Speech: Speech normal.         Behavior: Behavior normal.         Results    Results for orders placed or performed in visit on 05/22/24   Comprehensive Metabolic Panel    Collection Time: 05/22/24  9:22 AM    Specimen: Blood   Result Value Ref Range    Glucose 90 65 - 99 mg/dL    BUN 17 8 - 23 mg/dL    Creatinine 1.14 0.76 - 1.27 mg/dL    Sodium 137 136 - 145 mmol/L    Potassium 4.6 3.5 - 5.2 mmol/L    Chloride 104 98 - 107 mmol/L    CO2 23.0 22.0 - 29.0 mmol/L    Calcium 9.4 8.6 - 10.5 mg/dL    Total Protein 7.3 6.0 - 8.5 g/dL    Albumin 4.2 3.5 - 5.2 g/dL    ALT (SGPT) 8 1 - 41 U/L    AST (SGOT) 15 1 - 40 U/L    Alkaline Phosphatase 68 39 - 117 U/L    Total Bilirubin 0.5 0.0 - 1.2 mg/dL    Globulin 3.1 gm/dL    A/G Ratio 1.4 g/dL    BUN/Creatinine Ratio 14.9 7.0 - 25.0    Anion Gap 10.0 5.0 - 15.0 mmol/L    eGFR 66.7 >60.0 mL/min/1.73   Lipid Panel    Collection Time: 05/22/24  9:22 AM    Specimen: Blood   Result Value Ref Range    Total Cholesterol 116 0 - 200 mg/dL    Triglycerides 53 0 - 150 mg/dL    HDL Cholesterol 65 (H) 40 - 60 mg/dL    LDL Cholesterol  39 0 - 100 mg/dL    VLDL Cholesterol 12 5 - 40 mg/dL    LDL/HDL Ratio 0.62        A/P    Problem List Items Addressed This Visit          Endocrine and Metabolic    Class 1 obesity due to excess calories with serious comorbidity and body mass index (BMI) of 34.0 to 34.9 in adult - Primary     -We reviewed option of Zepbound for weight management. Patient would like to continue compounded semaglutide at this time. He has tolerated this well with only minor GI side-effects with are controlled. Encouraged healthy diet and continuing new exercise regimen. A new prescription for compounded [facility]   -RTC in 3 months for wellness visit, sooner prn.     Plan of care was reviewed with patient at the  conclusion of today's visit. Education was provided regarding diagnoses, management, and the importance of keeping follow-up appointments. The patient was counseled regarding the risks, benefits, and possible side-effects of treatment. Patient and/or family express understanding and agreement with the management plan.        Carlos Enrique Jamison PA-C  Answers submitted by the patient for this visit:  Weight Management (Submitted on 2/18/2025)  Chief Complaint: Weight Management  Weight: increased  Weight change (lbs): 5  Weight loss treatment: starting exercise program, prescription medications  Medication side effects: constipation

## 2025-05-07 RX ORDER — FAMOTIDINE 20 MG/1
20 TABLET, FILM COATED ORAL 2 TIMES DAILY PRN
Qty: 180 TABLET | Refills: 0 | Status: SHIPPED | OUTPATIENT
Start: 2025-05-07

## 2025-05-28 NOTE — TELEPHONE ENCOUNTER
"    Caller: Shaka Gurrola \"Bill\"    Relationship: Self    Best call back number: [phone]     Requested Prescriptions:   Requested Prescriptions     Pending Prescriptions Disp Refills    Semaglutide-Weight Management 2.4 MG/0.75ML solution auto-injector       Sig: Inject 0.63 mL under the skin into the appropriate area as directed 1 (One) Time Per Week.        Pharmacy where request should be sent: [place] [address] [facility] [address] - [phone] [facility] [phone] FX     Last office visit with prescribing clinician: 2/19/2025   Last telemedicine visit with prescribing clinician: Visit date not found   Next office visit with prescribing clinician: Visit date not found     Additional details provided by patient: DOES HE NEED AN INCREASE DOSE    Does the patient have less than a 3 day supply:  [x] Yes  [] No    Would you like a call back once the refill request has been completed: [] Yes [] No    If the office needs to give you a call back, can they leave a voicemail: [] Yes [] No    Ibis Doll Rep   05/28/25 09:05 EDT     "

## 2025-05-28 NOTE — TELEPHONE ENCOUNTER
Please let the patient know I am no longer prescribing compounded GLP-1 medications due to FDA changes. I recommend a visit to discuss alternatives. We could do this at the same time of a wellness visit which is due.

## 2025-06-10 ENCOUNTER — OFFICE VISIT (OUTPATIENT)
Dept: FAMILY MEDICINE CLINIC | Facility: CLINIC | Age: 80
End: 2025-06-10
Payer: MEDICARE

## 2025-06-10 VITALS
OXYGEN SATURATION: 97 % | HEIGHT: 71 IN | BODY MASS INDEX: 34.16 KG/M2 | DIASTOLIC BLOOD PRESSURE: 80 MMHG | SYSTOLIC BLOOD PRESSURE: 138 MMHG | HEART RATE: 57 BPM | WEIGHT: 244 LBS

## 2025-06-10 DIAGNOSIS — E66.09 CLASS 1 OBESITY DUE TO EXCESS CALORIES WITH SERIOUS COMORBIDITY AND BODY MASS INDEX (BMI) OF 34.0 TO 34.9 IN ADULT: Primary | ICD-10-CM

## 2025-06-10 DIAGNOSIS — E66.811 CLASS 1 OBESITY DUE TO EXCESS CALORIES WITH SERIOUS COMORBIDITY AND BODY MASS INDEX (BMI) OF 34.0 TO 34.9 IN ADULT: Primary | ICD-10-CM

## 2025-06-10 DIAGNOSIS — N40.1 BENIGN PROSTATIC HYPERPLASIA WITH LOWER URINARY TRACT SYMPTOMS, SYMPTOM DETAILS UNSPECIFIED: ICD-10-CM

## 2025-06-10 RX ORDER — TAMSULOSIN HYDROCHLORIDE 0.4 MG/1
CAPSULE ORAL
COMMUNITY
Start: 2024-10-06

## 2025-06-10 RX ORDER — NAPROXEN SODIUM 220 MG/1
440 TABLET, FILM COATED ORAL
COMMUNITY
Start: 2025-03-14

## 2025-06-10 RX ORDER — SYRINGE AND NEEDLE,INSULIN,1ML 29 G X1/2"
SYRINGE, EMPTY DISPOSABLE MISCELLANEOUS SEE ADMIN INSTRUCTIONS
COMMUNITY
Start: 2025-03-27

## 2025-06-10 RX ORDER — TIRZEPATIDE 2.5 MG/.5ML
2.5 INJECTION, SOLUTION SUBCUTANEOUS WEEKLY
Qty: 2 ML | Refills: 0 | Status: SHIPPED | OUTPATIENT
Start: 2025-06-10

## 2025-06-10 RX ORDER — VITAMIN B COMPLEX
2 CAPSULE ORAL DAILY
COMMUNITY

## 2025-06-10 RX ORDER — AMOXICILLIN 250 MG
2 CAPSULE ORAL 2 TIMES DAILY
COMMUNITY
Start: 2025-03-14

## 2025-06-10 RX ORDER — PHENAZOPYRIDINE HYDROCHLORIDE 95 MG/1
95 TABLET ORAL
COMMUNITY
Start: 2025-03-13

## 2025-06-10 NOTE — PROGRESS NOTES
Chief Complaint   Patient presents with    Med Refill       HPI     Shaka Gurrola is a very pleasant 79 y.o. male who is here for follow-up of obesity and weight management.     He had been doing well on compounded semaglutide but ran out of the medication. Constipation has been improved and managed on Colace twice daily prn. He is walking 1.5 miles on treadmill and doing some resistance training. Denies significant changes to his diet.    He underwent laser surgery for BPH in March at the [facility] in [place]. This improved his urinary symptoms. He has some incontinence which is improving.     Past Medical History:   Diagnosis Date    Atrophic testicle     Left    BPH with elevated PSA     Hiatal hernia with gastroesophageal reflux     Hx of iron deficiency anemia     Inguinal hernia     Right       Past Surgical History:   Procedure Laterality Date    ANKLE SURGERY Left     Plate    CARDIAC CATHETERIZATION      CORONARY ANGIOPLASTY WITH STENT PLACEMENT      JOINT REPLACEMENT      KNEE ARTHROSCOPY Right     PROSTATE BIOPSY      TONSILLECTOMY      TOTAL HIP ARTHROPLASTY Right 01/14/2019    Dr. Fernandez    TOTAL HIP ARTHROPLASTY REVISION Left     VASECTOMY         Family History   Problem Relation Age of Onset    Aneurysm Mother     Lung cancer Father     Cancer Father        Social History     Socioeconomic History    Marital status:    Tobacco Use    Smoking status: Never    Smokeless tobacco: Never   Vaping Use    Vaping status: Never Used   Substance and Sexual Activity    Alcohol use: Yes     Alcohol/week: 17.0 standard drinks of alcohol     Types: 7 Glasses of wine, 10 Shots of liquor per week    Drug use: No    Sexual activity: Yes     Partners: Female       Allergies   Allergen Reactions    Amlodipine Other (See Comments)     Leg swelling, shortness of breath       ROS    Review of Systems   Gastrointestinal:  Positive for constipation. Negative for abdominal pain, nausea, vomiting and GERD.  "      Vitals:    06/10/25 1448   BP: 138/80   Pulse: 57   SpO2: 97%     Body mass index is 34.16 kg/m².      Current Outpatient Medications:     aspirin 81 MG EC tablet, Take 1 tablet by mouth Daily., Disp: , Rfl:     b complex vitamins capsule, Take 2 capsules by mouth Daily., Disp: , Rfl:     esomeprazole (nexIUM) 40 MG capsule, Take 1 capsule by mouth Every Morning Before Breakfast., Disp: , Rfl:     famotidine (PEPCID) 20 MG tablet, TAKE 1 TABLET BY MOUTH TWICE DAILY AS NEEDED FOR HEARTBURN, Disp: 180 tablet, Rfl: 0    losartan (COZAAR) 100 MG tablet, TAKE 1 TABLET BY MOUTH DAILY, Disp: 90 tablet, Rfl: 0    naproxen sodium (ALEVE) 220 MG tablet, Take 2 tablets by mouth., Disp: , Rfl:     Papaverine-Phentolamine 30-1 MG/ML solution, Inject as directed, Disp: , Rfl:     phenazopyridine (PYRIDIUM) 95 MG tablet, Take 1 tablet by mouth., Disp: , Rfl:     rosuvastatin (CRESTOR) 5 MG tablet, TAKE 1 TABLET BY MOUTH EVERY OTHER DAY, Disp: 45 tablet, Rfl: 0    sennosides-docusate (PERICOLACE) 8.6-50 MG per tablet, Take 2 tablets by mouth 2 (Two) Times a Day., Disp: , Rfl:     SURE COMFORT INS SYR 1CC/29G 29G X 1/2\" 1 ML misc, See Admin Instructions., Disp: , Rfl:     tamsulosin (FLOMAX) 0.4 MG capsule 24 hr capsule, Take 1 capsule every day by oral route for 30 days., Disp: , Rfl:     Diclofenac Sodium (VOLTAREN) 1 % gel gel, Apply 4 g topically to the appropriate area as directed 4 (Four) Times a Day As Needed (hand arthritis)., Disp: 100 g, Rfl: 2    Tirzepatide-Weight Management (Zepbound) 2.5 MG/0.5ML solution, Inject 0.5 mL under the skin into the appropriate area as directed 1 (One) Time Per Week., Disp: 2 mL, Rfl: 0    PE    Physical Exam  Vitals reviewed.   Constitutional:       General: He is not in acute distress.  Pulmonary:      Effort: Pulmonary effort is normal. No respiratory distress.   Neurological:      Mental Status: He is alert.   Psychiatric:         Mood and Affect: Mood normal. "         Results    Results for orders placed or performed in visit on 05/22/24   Comprehensive Metabolic Panel    Collection Time: 05/22/24  9:22 AM    Specimen: Blood   Result Value Ref Range    Glucose 90 65 - 99 mg/dL    BUN 17 8 - 23 mg/dL    Creatinine 1.14 0.76 - 1.27 mg/dL    Sodium 137 136 - 145 mmol/L    Potassium 4.6 3.5 - 5.2 mmol/L    Chloride 104 98 - 107 mmol/L    CO2 23.0 22.0 - 29.0 mmol/L    Calcium 9.4 8.6 - 10.5 mg/dL    Total Protein 7.3 6.0 - 8.5 g/dL    Albumin 4.2 3.5 - 5.2 g/dL    ALT (SGPT) 8 1 - 41 U/L    AST (SGOT) 15 1 - 40 U/L    Alkaline Phosphatase 68 39 - 117 U/L    Total Bilirubin 0.5 0.0 - 1.2 mg/dL    Globulin 3.1 gm/dL    A/G Ratio 1.4 g/dL    BUN/Creatinine Ratio 14.9 7.0 - 25.0    Anion Gap 10.0 5.0 - 15.0 mmol/L    eGFR 66.7 >60.0 mL/min/1.73   Lipid Panel    Collection Time: 05/22/24  9:22 AM    Specimen: Blood   Result Value Ref Range    Total Cholesterol 116 0 - 200 mg/dL    Triglycerides 53 0 - 150 mg/dL    HDL Cholesterol 65 (H) 40 - 60 mg/dL    LDL Cholesterol  39 0 - 100 mg/dL    VLDL Cholesterol 12 5 - 40 mg/dL    LDL/HDL Ratio 0.62        A/P    Problem List Items Addressed This Visit          Endocrine and Metabolic    Class 1 obesity due to excess calories with serious comorbidity and body mass index (BMI) of 34.0 to 34.9 in adult - Primary       Genitourinary and Reproductive     Benign prostatic hyperplasia with lower urinary tract symptoms     -We discussed the removal of semaglutide from the drug shortage list by the FDA. He would be interested in a trial of zepbound after discussion. This will be sent to the [facility]s pharmacy.   -The patient states his insurance previously covered executive physical's at the [facility] but no longer does.  -Follow-up in 3 months for weight management, sooner prn.     Plan of care was reviewed with patient at the conclusion of today's visit. Education was provided regarding diagnoses, management, and the importance of  keeping follow-up appointments. The patient was counseled regarding the risks, benefits, and possible side-effects of treatment. Patient and/or family express understanding and agreement with the management plan.        Carlos Enrique Jamison PA-C  Answers submitted by the patient for this visit:  Weight Management (Submitted on 6/5/2025)  Chief Complaint: Weight Management  Weight: unchanged  Eating habit changes: No changes  Energy level: unchanged  Physical activity tolerance: improved

## 2025-06-14 PROBLEM — N40.1 BENIGN PROSTATIC HYPERPLASIA WITH LOWER URINARY TRACT SYMPTOMS: Status: ACTIVE | Noted: 2025-06-14

## 2025-07-02 RX ORDER — LOSARTAN POTASSIUM 100 MG/1
100 TABLET ORAL DAILY
Qty: 90 TABLET | Refills: 0 | Status: SHIPPED | OUTPATIENT
Start: 2025-07-02

## 2025-07-02 NOTE — TELEPHONE ENCOUNTER
Rx Refill Note  Requested Prescriptions     Pending Prescriptions Disp Refills    losartan (COZAAR) 100 MG tablet [Pharmacy Med Name: LOSARTAN 100MG TABLETS] 90 tablet 0     Sig: TAKE 1 TABLET BY MOUTH DAILY      Last office visit with prescribing clinician: 6/10/2025   Last telemedicine visit with prescribing clinician: Visit date not found   Next office visit with prescribing clinician: 9/10/2025                         Would you like a call back once the refill request has been completed: [] Yes [] No    If the office needs to give you a call back, can they leave a voicemail: [] Yes [] No    April WALDO Carrillo  07/02/25, 10:35 EDT

## 2025-07-10 ENCOUNTER — TELEPHONE (OUTPATIENT)
Dept: FAMILY MEDICINE CLINIC | Facility: CLINIC | Age: 80
End: 2025-07-10
Payer: MEDICARE

## 2025-07-10 NOTE — TELEPHONE ENCOUNTER
"Caller: Shaka Gurrola \"Bill\"    Relationship: Self    Best call back number: [phone]     Which medication are you concerned about: ZEPBOUND    Who prescribed you this medication: AYUSH ROSARIO    What are your concerns: PATIENT IS READY FOR INCREASE DOSAGE TO 5MG ZEPBOUND. PLEASE SEND PRESCRIPTION TO [facility].       "

## 2025-07-11 RX ORDER — TIRZEPATIDE 5 MG/.5ML
5 INJECTION, SOLUTION SUBCUTANEOUS WEEKLY
Qty: 2 ML | Refills: 0 | Status: SHIPPED | OUTPATIENT
Start: 2025-07-11

## 2025-07-21 ENCOUNTER — TELEPHONE (OUTPATIENT)
Dept: FAMILY MEDICINE CLINIC | Facility: CLINIC | Age: 80
End: 2025-07-21
Payer: MEDICARE

## 2025-07-21 NOTE — TELEPHONE ENCOUNTER
"  Caller: Shaka Gurrola \"Bill\"    Relationship: Self    Best call back number: [phone]     What was the call regarding: ZEPBOUND. EXPERIENCING VOMITING, DIARRHEA, SWELLING AT INJECTION SITE, SHAKES/SHIVERING. PATIENT WOULD LIKE TO HAVE SOMETHING ELSE CALLED IN.     [employer] [id number] [address] [facility] - [phone]  - [phone] FX     Is it okay if the provider responds through MyChart: NO  "

## 2025-07-22 NOTE — TELEPHONE ENCOUNTER
Patient informed of recommendations to schedule a visit. He declined stating he has too many people in from out of town. His sx only lasted about 14 hours and have subsided. I did advise to go to the ER if the symptoms return or worsen.    Anything he needs to do for now otherwise? He could schedule a visit next week if this is recommended still

## 2025-07-22 NOTE — TELEPHONE ENCOUNTER
Stop Zepbound. Shaking/shivering would be unusual. I recommend an appointment in the office tomorrow. Please let me know if he cannot be worked in. I recommend the ER if he has a fever or vomiting persists or worsens.

## 2025-07-30 ENCOUNTER — OFFICE VISIT (OUTPATIENT)
Dept: FAMILY MEDICINE CLINIC | Facility: CLINIC | Age: 80
End: 2025-07-30
Payer: MEDICARE

## 2025-07-30 VITALS
DIASTOLIC BLOOD PRESSURE: 76 MMHG | WEIGHT: 244 LBS | HEIGHT: 71 IN | BODY MASS INDEX: 34.16 KG/M2 | SYSTOLIC BLOOD PRESSURE: 128 MMHG | OXYGEN SATURATION: 98 % | HEART RATE: 55 BPM

## 2025-07-30 DIAGNOSIS — E66.811 CLASS 1 OBESITY DUE TO EXCESS CALORIES WITH SERIOUS COMORBIDITY AND BODY MASS INDEX (BMI) OF 34.0 TO 34.9 IN ADULT: Primary | ICD-10-CM

## 2025-07-30 DIAGNOSIS — E66.09 CLASS 1 OBESITY DUE TO EXCESS CALORIES WITH SERIOUS COMORBIDITY AND BODY MASS INDEX (BMI) OF 34.0 TO 34.9 IN ADULT: Primary | ICD-10-CM

## 2025-07-30 DIAGNOSIS — I25.10 CORONARY ARTERY DISEASE INVOLVING NATIVE CORONARY ARTERY OF NATIVE HEART WITHOUT ANGINA PECTORIS: ICD-10-CM

## 2025-07-30 RX ORDER — SEMAGLUTIDE 0.25 MG/.5ML
0.25 INJECTION, SOLUTION SUBCUTANEOUS WEEKLY
Qty: 2 ML | Refills: 0 | Status: SHIPPED | OUTPATIENT
Start: 2025-07-30

## 2025-07-30 NOTE — PROGRESS NOTES
"    Chief Complaint   Patient presents with    MEDICATION DISCUSSION     Wanting to discuss weight loss medication. Had a bad reaction to zepbound       HPI     Shaka Gurrola is a pleasant 80 y.o. male who presents for evaluation of \"chief complaint.\"        He reports that after increasing the dose of Zepbound to 0.5 mg, he experienced severe side effects including vomiting, diarrhea, sweating, and chills. He also mentions an episode of shaking in bed with teeth chattering. The reaction occurred the day after the increased dose. He reports no abdominal pain. He has previously tolerated semaglutide well and has used Ozempic without any issues. He is not experiencing any abdominal pain, gastrointestinal symptoms, nausea, vomiting, or constipation today. He did not experience any throat swelling, lip or tongue swelling, or difficulty breathing or swallowing. He initially had swelling and itching around his injection site after his 4th injection, just prior to increasing his dose.        Past Medical History:   Diagnosis Date    Atrophic testicle     Left    BPH with elevated PSA     Hiatal hernia with gastroesophageal reflux     Hx of iron deficiency anemia     Inguinal hernia     Right       Past Surgical History:   Procedure Laterality Date    ANKLE SURGERY Left     Plate    CARDIAC CATHETERIZATION      CORONARY ANGIOPLASTY WITH STENT PLACEMENT      JOINT REPLACEMENT      KNEE ARTHROSCOPY Right     PROSTATE BIOPSY      TONSILLECTOMY      TOTAL HIP ARTHROPLASTY Right 01/14/2019    Dr. Fernandez    TOTAL HIP ARTHROPLASTY REVISION Left     VASECTOMY         Family History   Problem Relation Age of Onset    Aneurysm Mother     Lung cancer Father     Cancer Father        Social History     Socioeconomic History    Marital status:    Tobacco Use    Smoking status: Never    Smokeless tobacco: Never   Vaping Use    Vaping status: Never Used   Substance and Sexual Activity    Alcohol use: Yes     Alcohol/week: 17.0 " "standard drinks of alcohol     Types: 7 Glasses of wine, 10 Shots of liquor per week    Drug use: No    Sexual activity: Yes     Partners: Female       Allergies   Allergen Reactions    Amlodipine Other (See Comments)     Leg swelling, shortness of breath    Tirzepatide Other (See Comments)     Injection side reaction, n/v, shaking/chills       ROS    Review of Systems   Gastrointestinal:  Negative for abdominal pain, constipation, diarrhea, nausea and vomiting.       Vitals:    07/30/25 1442   BP: 128/76   Pulse: 55   SpO2: 98%     Body mass index is 34.16 kg/m².      Current Outpatient Medications:     aspirin 81 MG EC tablet, Take 1 tablet by mouth Daily., Disp: , Rfl:     b complex vitamins capsule, Take 2 capsules by mouth Daily., Disp: , Rfl:     esomeprazole (nexIUM) 40 MG capsule, Take 1 capsule by mouth Every Morning Before Breakfast., Disp: , Rfl:     famotidine (PEPCID) 20 MG tablet, TAKE 1 TABLET BY MOUTH TWICE DAILY AS NEEDED FOR HEARTBURN, Disp: 180 tablet, Rfl: 0    losartan (COZAAR) 100 MG tablet, TAKE 1 TABLET BY MOUTH DAILY, Disp: 90 tablet, Rfl: 0    naproxen sodium (ALEVE) 220 MG tablet, Take 2 tablets by mouth., Disp: , Rfl:     Papaverine-Phentolamine 30-1 MG/ML solution, Inject as directed, Disp: , Rfl:     phenazopyridine (PYRIDIUM) 95 MG tablet, Take 1 tablet by mouth., Disp: , Rfl:     rosuvastatin (CRESTOR) 5 MG tablet, TAKE 1 TABLET BY MOUTH EVERY OTHER DAY, Disp: 45 tablet, Rfl: 0    sennosides-docusate (PERICOLACE) 8.6-50 MG per tablet, Take 2 tablets by mouth 2 (Two) Times a Day., Disp: , Rfl:     SURE COMFORT INS SYR 1CC/29G 29G X 1/2\" 1 ML misc, See Admin Instructions., Disp: , Rfl:     Wegovy 0.25 MG/0.5ML solution auto-injector, Inject 0.5 mL under the skin into the appropriate area as directed 1 (One) Time Per Week., Disp: 2 mL, Rfl: 0    PE    Physical Exam  Vitals reviewed.   Constitutional:       General: He is not in acute distress.  Pulmonary:      Effort: Pulmonary effort is " normal. No respiratory distress.   Neurological:      Mental Status: He is alert.   Psychiatric:         Mood and Affect: Mood normal.          A/P    Problem List Items Addressed This Visit          Cardiac and Vasculature    Coronary artery disease    Overview   2013: stents x2  Followed by [facility] cardiology, Jacque Balbuena, APRN         Relevant Medications    aspirin 81 MG EC tablet    Papaverine-Phentolamine 30-1 MG/ML solution    rosuvastatin (CRESTOR) 5 MG tablet    losartan (COZAAR) 100 MG tablet    Wegovy 0.25 MG/0.5ML solution auto-injector       Endocrine and Metabolic    Class 1 obesity due to excess calories with serious comorbidity and body mass index (BMI) of 34.0 to 34.9 in adult - Primary    Relevant Medications    rosuvastatin (CRESTOR) 5 MG tablet    Wegovy 0.25 MG/0.5ML solution auto-injector     -The patient experienced a significant adverse reaction to Zepbound. This medication was placed on his allergy list.   -He tolerated semaglutide well previously and would like to trial transitioning back to this medication. He has a history of CAD and 2 stents. We will see if insurance will cover Wegovy.   -RTC in 3 months for follow-up of obesity, sooner prn.     Plan of care was reviewed with patient at the conclusion of today's visit. Education was provided regarding diagnoses, management, prescribed or recommended OTC products, and the importance of compliance with follow-up appointments. The patient was counseled regarding the risks, benefits, and possible side-effects of treatment. I advised the patient to keep me informed of any acute changes in their status including new, worsening, or persistent symptoms. Patient expresses understanding and agreement with the management plan.        Carlos Enrique Jamison PA-C

## 2025-08-05 ENCOUNTER — TELEPHONE (OUTPATIENT)
Dept: FAMILY MEDICINE CLINIC | Facility: CLINIC | Age: 80
End: 2025-08-05

## 2025-08-05 DIAGNOSIS — H90.5 SENSORINEURAL HEARING LOSS (SNHL), UNSPECIFIED LATERALITY: Primary | ICD-10-CM

## 2025-08-08 RX ORDER — FAMOTIDINE 20 MG/1
20 TABLET, FILM COATED ORAL 2 TIMES DAILY PRN
Qty: 180 TABLET | Refills: 0 | Status: SHIPPED | OUTPATIENT
Start: 2025-08-08

## 2025-08-08 RX ORDER — LOSARTAN POTASSIUM 100 MG/1
100 TABLET ORAL DAILY
Qty: 90 TABLET | Refills: 0 | Status: SHIPPED | OUTPATIENT
Start: 2025-08-08

## 2025-08-19 RX ORDER — FAMOTIDINE 20 MG/1
20 TABLET, FILM COATED ORAL 2 TIMES DAILY PRN
Qty: 180 TABLET | Refills: 0 | Status: SHIPPED | OUTPATIENT
Start: 2025-08-19

## 2025-08-21 ENCOUNTER — TELEPHONE (OUTPATIENT)
Dept: FAMILY MEDICINE CLINIC | Facility: CLINIC | Age: 80
End: 2025-08-21
Payer: MEDICARE

## 2025-08-21 DIAGNOSIS — E66.811 CLASS 1 OBESITY DUE TO EXCESS CALORIES WITH SERIOUS COMORBIDITY AND BODY MASS INDEX (BMI) OF 34.0 TO 34.9 IN ADULT: Primary | ICD-10-CM

## 2025-08-21 DIAGNOSIS — E66.09 CLASS 1 OBESITY DUE TO EXCESS CALORIES WITH SERIOUS COMORBIDITY AND BODY MASS INDEX (BMI) OF 34.0 TO 34.9 IN ADULT: Primary | ICD-10-CM

## 2025-08-21 RX ORDER — SEMAGLUTIDE 0.5 MG/.5ML
0.5 INJECTION, SOLUTION SUBCUTANEOUS
Qty: 2 ML | Refills: 0 | Status: SHIPPED | OUTPATIENT
Start: 2025-08-21